# Patient Record
Sex: MALE | Race: WHITE | NOT HISPANIC OR LATINO | Employment: FULL TIME | URBAN - METROPOLITAN AREA
[De-identification: names, ages, dates, MRNs, and addresses within clinical notes are randomized per-mention and may not be internally consistent; named-entity substitution may affect disease eponyms.]

---

## 2017-10-16 ENCOUNTER — APPOINTMENT (OUTPATIENT)
Dept: SOCIAL WORK | Facility: CLINIC | Age: 52
End: 2017-10-16

## 2017-10-16 PROCEDURE — 90686 IIV4 VACC NO PRSV 0.5 ML IM: CPT | Performed by: REGISTERED NURSE

## 2018-10-03 ENCOUNTER — IMMUNIZATION (OUTPATIENT)
Dept: SOCIAL WORK | Facility: CLINIC | Age: 53
End: 2018-10-03

## 2018-10-03 DIAGNOSIS — Z23 NEED FOR VACCINATION: ICD-10-CM

## 2018-10-03 PROCEDURE — 90686 IIV4 VACC NO PRSV 0.5 ML IM: CPT | Performed by: REGISTERED NURSE

## 2019-10-22 ENCOUNTER — IMMUNIZATION (OUTPATIENT)
Dept: SOCIAL WORK | Facility: CLINIC | Age: 54
End: 2019-10-22

## 2019-10-22 DIAGNOSIS — Z23 NEED FOR VACCINATION: ICD-10-CM

## 2019-10-22 PROCEDURE — 90686 IIV4 VACC NO PRSV 0.5 ML IM: CPT | Performed by: REGISTERED NURSE

## 2019-11-04 ENCOUNTER — TELEPHONE (OUTPATIENT)
Dept: SCHEDULING | Facility: IMAGING CENTER | Age: 54
End: 2019-11-04

## 2019-11-11 ENCOUNTER — OFFICE VISIT (OUTPATIENT)
Dept: MEDICAL GROUP | Facility: MEDICAL CENTER | Age: 54
End: 2019-11-11
Payer: COMMERCIAL

## 2019-11-11 VITALS
DIASTOLIC BLOOD PRESSURE: 80 MMHG | SYSTOLIC BLOOD PRESSURE: 104 MMHG | HEIGHT: 69 IN | OXYGEN SATURATION: 97 % | HEART RATE: 72 BPM | WEIGHT: 197.53 LBS | TEMPERATURE: 98.1 F | BODY MASS INDEX: 29.26 KG/M2

## 2019-11-11 DIAGNOSIS — E78.00 PURE HYPERCHOLESTEROLEMIA: ICD-10-CM

## 2019-11-11 DIAGNOSIS — I10 ESSENTIAL HYPERTENSION: ICD-10-CM

## 2019-11-11 DIAGNOSIS — E11.9 CONTROLLED TYPE 2 DIABETES MELLITUS WITHOUT COMPLICATION, WITHOUT LONG-TERM CURRENT USE OF INSULIN (HCC): ICD-10-CM

## 2019-11-11 DIAGNOSIS — F33.0 MILD EPISODE OF RECURRENT MAJOR DEPRESSIVE DISORDER (HCC): ICD-10-CM

## 2019-11-11 PROCEDURE — 99204 OFFICE O/P NEW MOD 45 MIN: CPT | Performed by: FAMILY MEDICINE

## 2019-11-11 ASSESSMENT — ENCOUNTER SYMPTOMS
CHILLS: 0
COUGH: 0
HEADACHES: 0
DIZZINESS: 0
PALPITATIONS: 0
FEVER: 0
BLURRED VISION: 0
BRUISES/BLEEDS EASILY: 0
VOMITING: 0
DIARRHEA: 0
FALLS: 0
DOUBLE VISION: 0

## 2019-11-11 ASSESSMENT — PATIENT HEALTH QUESTIONNAIRE - PHQ9: CLINICAL INTERPRETATION OF PHQ2 SCORE: 0

## 2019-11-11 NOTE — ASSESSMENT & PLAN NOTE
For the past year has been feeling down  Lack of motivation  Very unhappy at job  , good relationship with wife.   Not finding wallace in the things he used to.   Has tried meds before with no improvement   Interested in therapy.   No SI

## 2019-11-11 NOTE — PROGRESS NOTES
Subjective:     CC: Diagnoses of Controlled type 2 diabetes mellitus without complication, without long-term current use of insulin (HCC), Essential hypertension, Pure hypercholesterolemia, and Mild episode of recurrent major depressive disorder (HCC) were pertinent to this visit.    HPI: Patient is a 54 y.o. male established patient who presents today to [facility].      Controlled type 2 diabetes mellitus without complication, without long-term current use of insulin (HCC)  Chronic problem.  Currently on metformin 500 mg daily.  Last lab done in May 2019. Needs refill on metformin. He was started on metformin in May.   Stopped drinking soda.   Trying to watch what he eats.   Has lost 10lbs since his diagnosis.   Last A1c was 6 months ago.    Mild episode of recurrent major depressive disorder (HCC)  New problem.  For the past year has been feeling down  Lack of motivation  Very unhappy at job  , good relationship with wife.   Not finding wallace in the things he used to.   Has tried meds before with no improvement, this was years ago.  He does not want to restart medication.  He is however, interested in therapy.   No SI    Pure hypercholesterolemia  Chronic problem.  The patient is currently on atorvastatin.  Denies any side effects.  Last labs done in May 2019.    Essential hypertension  Chronic problem.  Currently on losartan.  Blood pressures well controlled.  Denies any side effects from medication.  He does not need a refill today.      Past Medical History:   Diagnosis Date   • Allergy    • Hypertension        Social History     Tobacco Use   • Smoking status: Never Smoker   • Smokeless tobacco: Never Used   Substance Use Topics   • Alcohol use: Yes     Comment: less than once a week   • Drug use: Never       Current Outpatient Medications Ordered in Epic   Medication Sig Dispense Refill   • metFORMIN (GLUCOPHAGE) 500 MG Tab Take 1 Tab by mouth every day. 90 Tab 1   • cetirizine (KLS ALLER-DOMENICO) 10 MG  "Tab Take 10 mg by mouth every day.     • ATORVASTATIN CALCIUM PO Take  by mouth.     • LOSARTAN POTASSIUM PO Take  by mouth.       No current Epic-ordered facility-administered medications on file.        Allergies:  Patient has no known allergies.    Health Maintenance: Completed    Review of Systems   Constitutional: Negative for chills and fever.   HENT: Negative for ear pain and hearing loss.    Eyes: Negative for blurred vision and double vision.   Respiratory: Negative for cough.    Cardiovascular: Negative for chest pain and palpitations.   Gastrointestinal: Negative for diarrhea and vomiting.   Genitourinary: Negative for hematuria.   Musculoskeletal: Negative for falls.   Skin: Negative for rash.   Neurological: Negative for dizziness and headaches.   Endo/Heme/Allergies: Does not bruise/bleed easily.       Objective:       Exam:  /80 (BP Location: Left arm, Patient Position: Sitting, BP Cuff Size: Adult)   Pulse 72   Temp 36.7 °C (98.1 °F) (Temporal)   Ht 1.753 m (5' 9\")   Wt 89.6 kg (197 lb 8.5 oz)   SpO2 97%   BMI 29.17 kg/m²  Body mass index is 29.17 kg/m².      General: Normal appearing. No distress.  HEAD: NCAT  EYES: conjunctiva clear, lids without ptosis, pupils equal  and reactive to light  EARS: ears normal shape and contour, canals are clear bilaterally, TMs clear  MOUTH: oropharynx is without erythema, edema or exudates.   Neck: Supple without masses. Thyroid is not enlarged. Normal ROM  Pulmonary: Clear to ausculation.  Normal effort. No rales, ronchi, or wheezing.  Cardiovascular: Regular rate and rhythm, no murmur. No LE edema  Neurologic: Grossly normal, no focal deficits  Lymph: No cervical or supraclavicular lymph nodes are palpable  Skin: Warm and dry.  No obvious lesions.  Musculoskeletal: Normal gait and station.   Psych: Normal mood and affect. Alert and oriented x3. Judgment and insight is normal.      Assessment & Plan:     54 y.o. male with the following -     1. " Controlled type 2 diabetes mellitus without complication, without long-term current use of insulin (HCC)  Chronic problem, need to discuss.  Refill placed for metformin 500 mg daily.  Orders placed for following labs.  Plan to follow-up in 1 month to review labs.  - HEMOGLOBIN A1C; Future  - Lipid Profile; Future  - MICROALBUMIN CREAT RATIO URINE; Future  - Comp Metabolic Panel; Future    2. Essential hypertension  Chronic problem, new to discuss.  Well-controlled on losartan.  - Lipid Profile; Future    3. Pure hypercholesterolemia  Chronic problem, new to discuss.  Ordered the following labs.  Currently on atorvastatin.  - Lipid Profile; Future    4. Mild episode of recurrent major depressive disorder (HCC)  New problem.  The patient reports mild depression.  Denies any suicidal ideations.  He declines medication management currently.  However, he is interested in a referral to behavioral health.  - REFERRAL TO BEHAVIORAL HEALTH      Return in about 4 weeks (around 12/9/2019).    Please note that this dictation was created using voice recognition software. I have made every reasonable attempt to correct obvious errors, but I expect that there are errors of grammar and possibly content that I did not discover before finalizing the note.

## 2019-11-11 NOTE — ASSESSMENT & PLAN NOTE
Chronic problem.  Currently on losartan.  Blood pressures well controlled.  Denies any side effects from medication.  He does not need a refill today.

## 2019-11-11 NOTE — ASSESSMENT & PLAN NOTE
Last lab done in May 2019. Needs refill on metformin. He was started on metformin in May.   Stopped drinking soda.   Trying to watch he eats.   Has lost 10lbs since his diagnosis.

## 2019-11-11 NOTE — LETTER
[facility]  Love Redding M.D.  [address]  [place] [address]  Fax: [phone]   Authorization for Release/Disclosure of   Protected Health Information   Name: RUDDY MOSLEY : 1965 SSN: [id number]   Address: [facility] [place] [address] Phone:    [phone] (home)    I authorize the entity listed below to release/disclose the PHI below to:   [facility]/Love Redding M.D. and Love Redding M.D.   Provider or Entity Name:  [facility]   Address  City, [facility], [facility] Phone:  [phone]    Fax:  [phone]   Reason for request: continuity of care   Information to be released:    [ XXX ] LAST COLONOSCOPY,  including any PATH REPORT and follow-up  [  ] LAST FIT/COLOGUARD RESULT [  ] LAST DEXA  [  ] LAST MAMMOGRAM  [  ] LAST PAP  [  ] LAST LABS [  ] RETINA EXAM REPORT  [  ] IMMUNIZATION RECORDS  [  ] Release all info      [  ] Check here and initial the line next to each item to release ALL health information INCLUDING  _____ Care and treatment for drug and / or alcohol abuse  _____ HIV testing, infection status, or AIDS  _____ Genetic Testing    DATES OF SERVICE OR TIME PERIOD TO BE DISCLOSED: _____________  I understand and acknowledge that:  * This Authorization may be revoked at any time by you in writing, except if your health information has already been used or disclosed.  * Your health information that will be used or disclosed as a result of you signing this authorization could be re-disclosed by the recipient. If this occurs, your re-disclosed health information may no longer be protected by State or Federal laws.  * You may refuse to sign this Authorization. Your refusal will not affect your ability to obtain treatment.  * This Authorization becomes effective upon signing and will  on (date) __________.      If no date is indicated, this Authorization will  one (1) year from the signature date.    Name: Ruddy Mosley    Signature:   Date:          11/11/2019       PLEASE FAX REQUESTED RECORDS BACK TO: [phone]

## 2019-11-12 LAB
ALBUMIN SERPL-MCNC: 4.4 G/DL (ref 3.5–5.5)
ALBUMIN/CREAT UR: <4.5 MG/G CREAT (ref 0–30)
ALBUMIN/GLOB SERPL: 1.6 {RATIO} (ref 1.2–2.2)
ALP SERPL-CCNC: 75 IU/L (ref 39–117)
ALT SERPL-CCNC: 22 IU/L (ref 0–44)
AST SERPL-CCNC: 18 IU/L (ref 0–40)
BILIRUB SERPL-MCNC: 0.6 MG/DL (ref 0–1.2)
BUN SERPL-MCNC: 19 MG/DL (ref 6–24)
BUN/CREAT SERPL: 17 (ref 9–20)
CALCIUM SERPL-MCNC: 9.5 MG/DL (ref 8.7–10.2)
CHLORIDE SERPL-SCNC: 106 MMOL/L (ref 96–106)
CHOLEST SERPL-MCNC: 140 MG/DL (ref 100–199)
CO2 SERPL-SCNC: 18 MMOL/L (ref 20–29)
CREAT SERPL-MCNC: 1.15 MG/DL (ref 0.76–1.27)
CREAT UR-MCNC: 67 MG/DL
GLOBULIN SER CALC-MCNC: 2.7 G/DL (ref 1.5–4.5)
GLUCOSE SERPL-MCNC: 102 MG/DL (ref 65–99)
HBA1C MFR BLD: 6.2 % (ref 4.8–5.6)
HDLC SERPL-MCNC: 42 MG/DL
LABORATORY COMMENT REPORT: NORMAL
LDLC SERPL CALC-MCNC: 74 MG/DL (ref 0–99)
MICROALBUMIN UR-MCNC: <3 UG/ML
POTASSIUM SERPL-SCNC: 4.3 MMOL/L (ref 3.5–5.2)
PROT SERPL-MCNC: 7.1 G/DL (ref 6–8.5)
SODIUM SERPL-SCNC: 140 MMOL/L (ref 134–144)
TRIGL SERPL-MCNC: 119 MG/DL (ref 0–149)
VLDLC SERPL CALC-MCNC: 24 MG/DL (ref 5–40)

## 2019-12-10 ENCOUNTER — OFFICE VISIT (OUTPATIENT)
Dept: MEDICAL GROUP | Facility: MEDICAL CENTER | Age: 54
End: 2019-12-10
Payer: COMMERCIAL

## 2019-12-10 VITALS
DIASTOLIC BLOOD PRESSURE: 86 MMHG | OXYGEN SATURATION: 96 % | TEMPERATURE: 97.5 F | WEIGHT: 200.18 LBS | HEART RATE: 71 BPM | HEIGHT: 69 IN | BODY MASS INDEX: 29.65 KG/M2 | SYSTOLIC BLOOD PRESSURE: 116 MMHG

## 2019-12-10 DIAGNOSIS — E66.3 OVERWEIGHT: ICD-10-CM

## 2019-12-10 DIAGNOSIS — Z71.3 DIETARY COUNSELING: ICD-10-CM

## 2019-12-10 DIAGNOSIS — E78.00 PURE HYPERCHOLESTEROLEMIA: ICD-10-CM

## 2019-12-10 DIAGNOSIS — I10 ESSENTIAL HYPERTENSION: ICD-10-CM

## 2019-12-10 DIAGNOSIS — E11.9 CONTROLLED TYPE 2 DIABETES MELLITUS WITHOUT COMPLICATION, WITHOUT LONG-TERM CURRENT USE OF INSULIN (HCC): ICD-10-CM

## 2019-12-10 PROCEDURE — 99214 OFFICE O/P EST MOD 30 MIN: CPT | Mod: 25 | Performed by: FAMILY MEDICINE

## 2019-12-10 PROCEDURE — 99401 PREV MED CNSL INDIV APPRX 15: CPT | Performed by: FAMILY MEDICINE

## 2019-12-10 RX ORDER — ATORVASTATIN CALCIUM 40 MG/1
40 TABLET, FILM COATED ORAL
Refills: 3 | COMMUNITY
Start: 2019-11-04 | End: 2020-04-27

## 2019-12-10 RX ORDER — LOSARTAN POTASSIUM 50 MG/1
50 TABLET ORAL
Refills: 3 | COMMUNITY
Start: 2019-11-04 | End: 2020-04-27

## 2019-12-10 ASSESSMENT — PATIENT HEALTH QUESTIONNAIRE - PHQ9
SUM OF ALL RESPONSES TO PHQ QUESTIONS 1-9: 3
7. TROUBLE CONCENTRATING ON THINGS, SUCH AS READING THE NEWSPAPER OR WATCHING TELEVISION: NOT AT ALL
8. MOVING OR SPEAKING SO SLOWLY THAT OTHER PEOPLE COULD HAVE NOTICED. OR THE OPPOSITE, BEING SO FIGETY OR RESTLESS THAT YOU HAVE BEEN MOVING AROUND A LOT MORE THAN USUAL: NOT AT ALL
3. TROUBLE FALLING OR STAYING ASLEEP OR SLEEPING TOO MUCH: NOT AT ALL
1. LITTLE INTEREST OR PLEASURE IN DOING THINGS: SEVERAL DAYS
2. FEELING DOWN, DEPRESSED, IRRITABLE, OR HOPELESS: SEVERAL DAYS
5. POOR APPETITE OR OVEREATING: NOT AT ALL
9. THOUGHTS THAT YOU WOULD BE BETTER OFF DEAD, OR OF HURTING YOURSELF: NOT AT ALL
6. FEELING BAD ABOUT YOURSELF - OR THAT YOU ARE A FAILURE OR HAVE LET YOURSELF OR YOUR FAMILY DOWN: NOT AL ALL
4. FEELING TIRED OR HAVING LITTLE ENERGY: SEVERAL DAYS
SUM OF ALL RESPONSES TO PHQ9 QUESTIONS 1 AND 2: 2

## 2019-12-10 NOTE — PROGRESS NOTES
Subjective:     CC: Diagnoses of Pure hypercholesterolemia, Overweight, Essential hypertension, and Controlled type 2 diabetes mellitus without complication, without long-term current use of insulin (HCC) were pertinent to this visit.    HPI: Patient is a 54 y.o. male established patient who presents today to follow up on labs.       Pure hypercholesterolemia  Chronic problem. Well controlled. Currently on atorvastatin 40mg. LDL was 74. Patient is wondering if he could lower his doasage again.     Overweight  Chronic problem.  Patient reports a somewhat healthy diet regular exercise.  He is interested in coming off of his medications via weight loss.    Essential hypertension  Chronic problem, well-controlled.    Controlled type 2 diabetes mellitus without complication, without long-term current use of insulin (HCC)  Chronic problem.  A1c was 6.2%.  The patient is currently on metformin 500 mg daily.  He reports a somewhat healthy diet.      Past Medical History:   Diagnosis Date   • Allergy    • Hypertension        Social History     Tobacco Use   • Smoking status: Never Smoker   • Smokeless tobacco: Never Used   Substance Use Topics   • Alcohol use: Yes     Comment: less than once a week   • Drug use: Never       Current Outpatient Medications Ordered in Epic   Medication Sig Dispense Refill   • metFORMIN (GLUCOPHAGE) 500 MG Tab Take 1 Tab by mouth every day. 90 Tab 1   • cetirizine (KLS ALLER-DOMENICO) 10 MG Tab Take 10 mg by mouth every day.     • atorvastatin (LIPITOR) 40 MG Tab Take 40 mg by mouth.  3   • losartan (COZAAR) 50 MG Tab Take 50 mg by mouth.  3     No current [facility]-ordered facility-administered medications on file.        Allergies:  Patient has no known allergies.    Health Maintenance: Completed    ROS:  Pulm: no sob, no cough  CV: no chest pain, no palpitations      Objective:       Exam:  /86 (BP Location: Right arm, Patient Position: Sitting, BP Cuff Size: Adult)   Pulse 71   Temp 36.4 °C  "(97.5 °F) (Temporal)   Ht 1.753 m (5' 9\")   Wt 90.8 kg (200 lb 2.8 oz)   SpO2 96%   BMI 29.56 kg/m²  Body mass index is 29.56 kg/m².    General: Normal appearing. No distress.  HEAD: NCAT  EYES: conjunctiva clear, lids without ptosis, pupils equal and reactive to light  EARS: ears normal shape and contour.  MOUTH: normal dentition   Neck:  Normal ROM  Pulmonary: Normal effort. Normal respiratory rate.  Cardiovascular: Well perfused. No LE edema  Neurologic: Grossly normal, no focal deficits  Skin: Warm and dry.  No obvious lesions.  Musculoskeletal: Normal gait and station.   Psych: Normal mood and affect. Alert and oriented x3. Judgment and insight is normal.    Labs: 11/11/2019 results reviewed and discussed with the patient, questions answered.    Assessment & Plan:     54 y.o. male with the following -     1. Pure hypercholesterolemia  Chronic problem, well-controlled.  The patient was hoping that he could decrease his statin, however, advised that his LDL goal should be 70 given his type 2 diabetes and risk for heart disease.  He voices understanding, we will continue on atorvastatin 40 mg.    2. Overweight  Chronic problem.  15 minutes spent face-to-face on preventive health counseling.  Discussed diet and exercise at length today.  Reviewed and intermittent fasting diet as well as low-carb fat.    3. Essential hypertension  Chronic problem, well-controlled.  As above, discussed diet and exercise at length.    4. Controlled type 2 diabetes mellitus without complication, without long-term current use of insulin (HCC)  Chronic problem, well-controlled.  A1c is 6.2%.  Currently on metformin 500 mg.  Discussed diet and exercise.  We also discussed goal weight of 180, and reaching this may help him stop the metformin.      Return in about 6 months (around 6/10/2020).    Please note that this dictation was created using voice recognition software. I have made every reasonable attempt to correct obvious errors, " but I expect that there are errors of grammar and possibly content that I did not discover before finalizing the note.

## 2019-12-10 NOTE — ASSESSMENT & PLAN NOTE
Chronic problem. Well controlled. Currently on atorvastatin 40mg. LDL was 74. Patient is wondering if he could lower his doasage again.

## 2019-12-10 NOTE — ASSESSMENT & PLAN NOTE
Chronic problem.  Patient reports a somewhat healthy diet regular exercise.  He is interested in coming off of his medications via weight loss.

## 2019-12-11 NOTE — ASSESSMENT & PLAN NOTE
Chronic problem.  A1c was 6.2%.  The patient is currently on metformin 500 mg daily.  He reports a somewhat healthy diet.

## 2020-01-20 ENCOUNTER — HOSPITAL ENCOUNTER (OUTPATIENT)
Dept: RADIOLOGY | Facility: MEDICAL CENTER | Age: 55
End: 2020-01-20
Attending: SPECIALIST
Payer: COMMERCIAL

## 2020-01-20 DIAGNOSIS — J34.2 DEVIATED NASAL SEPTUM: ICD-10-CM

## 2020-01-20 DIAGNOSIS — J31.0 CHRONIC RHINITIS: ICD-10-CM

## 2020-01-20 DIAGNOSIS — J35.1 HYPERTROPHY OF TONSILS ALONE: ICD-10-CM

## 2020-01-20 DIAGNOSIS — R06.5: ICD-10-CM

## 2020-01-20 DIAGNOSIS — R06.83 SNORING: ICD-10-CM

## 2020-01-20 PROCEDURE — 700117 HCHG RX CONTRAST REV CODE 255: Performed by: SPECIALIST

## 2020-01-20 PROCEDURE — 70543 MRI ORBT/FAC/NCK W/O &W/DYE: CPT

## 2020-01-20 PROCEDURE — A9576 INJ PROHANCE MULTIPACK: HCPCS | Performed by: SPECIALIST

## 2020-01-20 RX ADMIN — GADOTERIDOL 18 ML: 279.3 INJECTION, SOLUTION INTRAVENOUS at 16:45

## 2020-04-10 ENCOUNTER — HOSPITAL ENCOUNTER (OUTPATIENT)
Dept: RADIOLOGY | Facility: MEDICAL CENTER | Age: 55
End: 2020-04-10
Attending: NURSE PRACTITIONER
Payer: COMMERCIAL

## 2020-04-14 DIAGNOSIS — I10 ESSENTIAL HYPERTENSION: ICD-10-CM

## 2020-04-24 ENCOUNTER — HOSPITAL ENCOUNTER (OUTPATIENT)
Dept: RADIOLOGY | Facility: MEDICAL CENTER | Age: 55
End: 2020-04-24
Attending: NURSE PRACTITIONER
Payer: COMMERCIAL

## 2020-04-24 DIAGNOSIS — D35.2 BENIGN NEOPLASM OF PITUITARY GLAND (HCC): ICD-10-CM

## 2020-04-24 PROCEDURE — A9576 INJ PROHANCE MULTIPACK: HCPCS | Performed by: NURSE PRACTITIONER

## 2020-04-24 PROCEDURE — 700117 HCHG RX CONTRAST REV CODE 255: Performed by: NURSE PRACTITIONER

## 2020-04-24 PROCEDURE — 70553 MRI BRAIN STEM W/O & W/DYE: CPT

## 2020-04-24 RX ADMIN — GADOTERIDOL 20 ML: 279.3 INJECTION, SOLUTION INTRAVENOUS at 15:20

## 2020-04-27 RX ORDER — LOSARTAN POTASSIUM 50 MG/1
TABLET ORAL
Qty: 90 TAB | Refills: 3 | Status: SHIPPED | OUTPATIENT
Start: 2020-04-27 | End: 2021-04-30

## 2020-04-27 RX ORDER — ATORVASTATIN CALCIUM 40 MG/1
TABLET, FILM COATED ORAL
Qty: 90 TAB | Refills: 3 | Status: SHIPPED | OUTPATIENT
Start: 2020-04-27 | End: 2021-06-11

## 2020-05-09 DIAGNOSIS — E11.9 CONTROLLED TYPE 2 DIABETES MELLITUS WITHOUT COMPLICATION, WITHOUT LONG-TERM CURRENT USE OF INSULIN (HCC): ICD-10-CM

## 2020-06-06 LAB
ALBUMIN SERPL-MCNC: 4.5 G/DL (ref 3.8–4.9)
ALBUMIN/CREAT UR: <4 MG/G CREAT (ref 0–29)
ALBUMIN/GLOB SERPL: 2.1 {RATIO} (ref 1.2–2.2)
ALP SERPL-CCNC: 75 IU/L (ref 39–117)
ALT SERPL-CCNC: 23 IU/L (ref 0–44)
AST SERPL-CCNC: 17 IU/L (ref 0–40)
BILIRUB SERPL-MCNC: 0.7 MG/DL (ref 0–1.2)
BUN SERPL-MCNC: 15 MG/DL (ref 6–24)
BUN/CREAT SERPL: 18 (ref 9–20)
CALCIUM SERPL-MCNC: 9.5 MG/DL (ref 8.7–10.2)
CHLORIDE SERPL-SCNC: 105 MMOL/L (ref 96–106)
CHOLEST SERPL-MCNC: 159 MG/DL (ref 100–199)
CO2 SERPL-SCNC: 20 MMOL/L (ref 20–29)
CREAT SERPL-MCNC: 0.83 MG/DL (ref 0.76–1.27)
CREAT UR-MCNC: 66.9 MG/DL
GLOBULIN SER CALC-MCNC: 2.1 G/DL (ref 1.5–4.5)
GLUCOSE SERPL-MCNC: 91 MG/DL (ref 65–99)
HBA1C MFR BLD: 6.2 % (ref 4.8–5.6)
HDLC SERPL-MCNC: 44 MG/DL
LABORATORY COMMENT REPORT: NORMAL
LDLC SERPL CALC-MCNC: 87 MG/DL (ref 0–99)
MICROALBUMIN UR-MCNC: <3 UG/ML
POTASSIUM SERPL-SCNC: 4.1 MMOL/L (ref 3.5–5.2)
PROT SERPL-MCNC: 6.6 G/DL (ref 6–8.5)
SODIUM SERPL-SCNC: 140 MMOL/L (ref 134–144)
TRIGL SERPL-MCNC: 142 MG/DL (ref 0–149)
VLDLC SERPL CALC-MCNC: 28 MG/DL (ref 5–40)

## 2020-06-11 ENCOUNTER — OFFICE VISIT (OUTPATIENT)
Dept: MEDICAL GROUP | Facility: MEDICAL CENTER | Age: 55
End: 2020-06-11
Payer: COMMERCIAL

## 2020-06-11 VITALS
SYSTOLIC BLOOD PRESSURE: 120 MMHG | OXYGEN SATURATION: 98 % | DIASTOLIC BLOOD PRESSURE: 84 MMHG | HEIGHT: 69 IN | HEART RATE: 87 BPM | WEIGHT: 199.52 LBS | BODY MASS INDEX: 29.55 KG/M2 | TEMPERATURE: 97.6 F

## 2020-06-11 DIAGNOSIS — E11.9 CONTROLLED TYPE 2 DIABETES MELLITUS WITHOUT COMPLICATION, WITHOUT LONG-TERM CURRENT USE OF INSULIN (HCC): ICD-10-CM

## 2020-06-11 DIAGNOSIS — E78.00 PURE HYPERCHOLESTEROLEMIA: ICD-10-CM

## 2020-06-11 DIAGNOSIS — I10 ESSENTIAL HYPERTENSION: ICD-10-CM

## 2020-06-11 DIAGNOSIS — D35.2 PITUITARY ADENOMA (HCC): ICD-10-CM

## 2020-06-11 PROCEDURE — 99214 OFFICE O/P EST MOD 30 MIN: CPT | Performed by: FAMILY MEDICINE

## 2020-06-11 NOTE — ASSESSMENT & PLAN NOTE
Recently diagnosed incidentally.  Prolactin level is slightly elevated.   Denies any headache or change in vision  They plan to move forward with surgery with Dr. Reyez.

## 2020-06-11 NOTE — PROGRESS NOTES
Subjective:     CC: Diagnoses of Pituitary adenoma (HCC), Essential hypertension, Controlled type 2 diabetes mellitus without complication, without long-term current use of insulin (HCC), and Pure hypercholesterolemia were pertinent to this visit.    HPI: Patient is a 54 y.o. male established patient who presents today for general follow up.     Pituitary adenoma (HCC)  Recently diagnosed incidentally.  Prolactin level is slightly elevated.   Denies any headache or change in vision  They plan to move forward with surgery with Dr. Reyez.   Followed by ENT.    Essential hypertension  Chronic problem. Well controlled on losartan 50mg.     Controlled type 2 diabetes mellitus without complication, without long-term current use of insulin (HCC)  Chronic problem. Well controlled on metformin 500mg. A1C 6.2% 6/5/20.    Pure hypercholesterolemia  Chronic problem. Currently on Atorvastatin 40mg. LDL 87 on recent lab work 6/5/20.    Past Medical History:   Diagnosis Date   • Allergy    • Hypertension        Social History     Tobacco Use   • Smoking status: Never Smoker   • Smokeless tobacco: Never Used   Substance Use Topics   • Alcohol use: Yes     Comment: less than once a week   • Drug use: Never       Current Outpatient Medications Ordered in Epic   Medication Sig Dispense Refill   • metFORMIN (GLUCOPHAGE) 500 MG Tab Take 1 Tab by mouth every day. 90 Tab 1   • atorvastatin (LIPITOR) 40 MG Tab TAKE 1 TABLET BY MOUTH ONCE DAILY AT BEDTIME 90 Tab 3   • losartan (COZAAR) 50 MG Tab TAKE 1 TABLET BY MOUTH ONCE DAILY 90 Tab 3   • cetirizine (KLS ALLER-DOMENICO) 10 MG Tab Take 10 mg by mouth every day.       No current [facility]-ordered facility-administered medications on file.        Allergies:  Patient has no known allergies.    Health Maintenance: Completed    ROS:  Pulm: no sob, no cough  CV: no chest pain, no palpitations      Objective:       Exam:  /84 (BP Location: Left arm, Patient Position: Sitting, BP Cuff Size: Adult  "long)   Pulse 87   Temp 36.4 °C (97.6 °F) (Temporal)   Ht 1.753 m (5' 9\")   Wt 90.5 kg (199 lb 8.3 oz)   SpO2 98%   BMI 29.46 kg/m²  Body mass index is 29.46 kg/m².    General: Normal appearing. No distress.  HEAD: NCAT  EYES: conjunctiva clear, lids without ptosis, pupils equal and reactive to light  EARS: ears normal shape and contour.  MOUTH: normal dentition   Neck:  Normal ROM  Pulmonary: CTAB, no W/R/R. Normal effort. Normal respiratory rate.  Cardiovascular: RRR, no M/R/R. Well perfused. No LE edema  Neurologic: Grossly normal, no focal deficits  Skin: Warm and dry.  No obvious lesions.  Musculoskeletal: Normal gait and station.   Psych: Normal mood and affect. Alert and oriented x3. Judgment and insight is normal.      Labs: 6/5/20 Results reviewed and discussed with the patient, questions answered.    Assessment & Plan:     54 y.o. male with the following -     1. Pituitary adenoma (HCC)  New problem.  Recently incidentally diagnosed via CT scan.  Currently asymptomatic.  However, adenoma is rather large measuring 2.6 x 1.9 x 1.8 cm.  There plan to move forward with surgical removal with Dr. Reyez.    2. Essential hypertension  Chronic problem, well-controlled on losartan.    3. Controlled type 2 diabetes mellitus without complication, without long-term current use of insulin (HCC)  Chronic problem, well-controlled on metformin 500 mg.  A1c 6.2%.    4. Pure hypercholesterolemia  Chronic problem, well-controlled on atorvastatin 40 mg.  LDL 87.      Return in about 6 months (around 12/11/2020).    Please note that this dictation was created using voice recognition software. I have made every reasonable attempt to correct obvious errors, but I expect that there are errors of grammar and possibly content that I did not discover before finalizing the note.      "

## 2020-07-31 ENCOUNTER — OFFICE VISIT (OUTPATIENT)
Dept: ADMISSIONS | Facility: MEDICAL CENTER | Age: 55
DRG: 615 | End: 2020-07-31
Attending: NEUROLOGICAL SURGERY
Payer: COMMERCIAL

## 2020-07-31 DIAGNOSIS — Z01.812 PRE-OPERATIVE LABORATORY EXAMINATION: ICD-10-CM

## 2020-07-31 DIAGNOSIS — Z01.810 PRE-OPERATIVE CARDIOVASCULAR EXAMINATION: ICD-10-CM

## 2020-07-31 LAB
ABO GROUP BLD: NORMAL
ANION GAP SERPL CALC-SCNC: 11 MMOL/L (ref 7–16)
APTT PPP: 29.8 SEC (ref 24.7–36)
BASOPHILS # BLD AUTO: 0.6 % (ref 0–1.8)
BASOPHILS # BLD: 0.06 K/UL (ref 0–0.12)
BLD GP AB SCN SERPL QL: NORMAL
BUN SERPL-MCNC: 17 MG/DL (ref 8–22)
CALCIUM SERPL-MCNC: 9.3 MG/DL (ref 8.5–10.5)
CHLORIDE SERPL-SCNC: 104 MMOL/L (ref 96–112)
CO2 SERPL-SCNC: 24 MMOL/L (ref 20–33)
COVID ORDER STATUS COVID19: NORMAL
CREAT SERPL-MCNC: 1.03 MG/DL (ref 0.5–1.4)
EKG IMPRESSION: NORMAL
EOSINOPHIL # BLD AUTO: 0.09 K/UL (ref 0–0.51)
EOSINOPHIL NFR BLD: 0.9 % (ref 0–6.9)
ERYTHROCYTE [DISTWIDTH] IN BLOOD BY AUTOMATED COUNT: 39.9 FL (ref 35.9–50)
GLUCOSE SERPL-MCNC: 85 MG/DL (ref 65–99)
HCT VFR BLD AUTO: 45.1 % (ref 42–52)
HGB BLD-MCNC: 14.8 G/DL (ref 14–18)
IMM GRANULOCYTES # BLD AUTO: 0.03 K/UL (ref 0–0.11)
IMM GRANULOCYTES NFR BLD AUTO: 0.3 % (ref 0–0.9)
INR PPP: 0.96 (ref 0.87–1.13)
LYMPHOCYTES # BLD AUTO: 4.49 K/UL (ref 1–4.8)
LYMPHOCYTES NFR BLD: 42.8 % (ref 22–41)
MCH RBC QN AUTO: 29.8 PG (ref 27–33)
MCHC RBC AUTO-ENTMCNC: 32.8 G/DL (ref 33.7–35.3)
MCV RBC AUTO: 90.9 FL (ref 81.4–97.8)
MONOCYTES # BLD AUTO: 0.8 K/UL (ref 0–0.85)
MONOCYTES NFR BLD AUTO: 7.6 % (ref 0–13.4)
NEUTROPHILS # BLD AUTO: 5.01 K/UL (ref 1.82–7.42)
NEUTROPHILS NFR BLD: 47.8 % (ref 44–72)
NRBC # BLD AUTO: 0 K/UL
NRBC BLD-RTO: 0 /100 WBC
PLATELET # BLD AUTO: 299 K/UL (ref 164–446)
PMV BLD AUTO: 10.1 FL (ref 9–12.9)
POTASSIUM SERPL-SCNC: 4.1 MMOL/L (ref 3.6–5.5)
PROTHROMBIN TIME: 13.1 SEC (ref 12–14.6)
RBC # BLD AUTO: 4.96 M/UL (ref 4.7–6.1)
RH BLD: NORMAL
SODIUM SERPL-SCNC: 139 MMOL/L (ref 135–145)
WBC # BLD AUTO: 10.5 K/UL (ref 4.8–10.8)

## 2020-07-31 PROCEDURE — 86901 BLOOD TYPING SEROLOGIC RH(D): CPT

## 2020-07-31 PROCEDURE — 93005 ELECTROCARDIOGRAM TRACING: CPT

## 2020-07-31 PROCEDURE — 93010 ELECTROCARDIOGRAM REPORT: CPT | Performed by: INTERNAL MEDICINE

## 2020-07-31 PROCEDURE — U0003 INFECTIOUS AGENT DETECTION BY NUCLEIC ACID (DNA OR RNA); SEVERE ACUTE RESPIRATORY SYNDROME CORONAVIRUS 2 (SARS-COV-2) (CORONAVIRUS DISEASE [COVID-19]), AMPLIFIED PROBE TECHNIQUE, MAKING USE OF HIGH THROUGHPUT TECHNOLOGIES AS DESCRIBED BY CMS-2020-01-R: HCPCS

## 2020-07-31 PROCEDURE — 36415 COLL VENOUS BLD VENIPUNCTURE: CPT

## 2020-07-31 PROCEDURE — 85610 PROTHROMBIN TIME: CPT

## 2020-07-31 PROCEDURE — 86850 RBC ANTIBODY SCREEN: CPT

## 2020-07-31 PROCEDURE — 80048 BASIC METABOLIC PNL TOTAL CA: CPT

## 2020-07-31 PROCEDURE — 85025 COMPLETE CBC W/AUTO DIFF WBC: CPT

## 2020-07-31 PROCEDURE — 86900 BLOOD TYPING SEROLOGIC ABO: CPT

## 2020-07-31 PROCEDURE — 85730 THROMBOPLASTIN TIME PARTIAL: CPT

## 2020-08-03 LAB
SARS-COV-2 RNA RESP QL NAA+PROBE: NOTDETECTED
SPECIMEN SOURCE: NORMAL

## 2020-08-07 ENCOUNTER — OFFICE VISIT (OUTPATIENT)
Dept: ADMISSIONS | Facility: MEDICAL CENTER | Age: 55
DRG: 615 | End: 2020-08-07
Attending: NEUROLOGICAL SURGERY
Payer: COMMERCIAL

## 2020-08-07 DIAGNOSIS — Z01.812 PRE-OPERATIVE LABORATORY EXAMINATION: ICD-10-CM

## 2020-08-07 LAB — COVID ORDER STATUS COVID19: NORMAL

## 2020-08-07 PROCEDURE — U0003 INFECTIOUS AGENT DETECTION BY NUCLEIC ACID (DNA OR RNA); SEVERE ACUTE RESPIRATORY SYNDROME CORONAVIRUS 2 (SARS-COV-2) (CORONAVIRUS DISEASE [COVID-19]), AMPLIFIED PROBE TECHNIQUE, MAKING USE OF HIGH THROUGHPUT TECHNOLOGIES AS DESCRIBED BY CMS-2020-01-R: HCPCS

## 2020-08-08 LAB
SARS-COV-2 RNA RESP QL NAA+PROBE: NOTDETECTED
SPECIMEN SOURCE: NORMAL

## 2020-08-11 ENCOUNTER — ANESTHESIA EVENT (OUTPATIENT)
Dept: SURGERY | Facility: MEDICAL CENTER | Age: 55
DRG: 615 | End: 2020-08-11
Payer: COMMERCIAL

## 2020-08-11 NOTE — OR NURSING
COVID-19 Pre-surgery screenin. Do you have an undiagnosed respiratory illness or symptoms such as coughing or sneezing?No   a. Onset of Sx No  b. Acute vs. chronic respiratory illness No    2. Do you have an unexplained fever greater than 100.4 degrees Fahrenheit or 38 degrees Celsius?     No     3. Have you had direct exposure to a patient who tested positive for Covid-19?    No     4. Have you had any loss of your sense of taste or smell? Have you had N/V or sore throat? No    Patient has been informed of visitor policy and asked to wear a mask upon entering the hospital   Yes

## 2020-08-12 ENCOUNTER — APPOINTMENT (OUTPATIENT)
Dept: RADIOLOGY | Facility: MEDICAL CENTER | Age: 55
DRG: 615 | End: 2020-08-12
Attending: NEUROLOGICAL SURGERY
Payer: COMMERCIAL

## 2020-08-12 ENCOUNTER — ANESTHESIA (OUTPATIENT)
Dept: SURGERY | Facility: MEDICAL CENTER | Age: 55
DRG: 615 | End: 2020-08-12
Payer: COMMERCIAL

## 2020-08-12 ENCOUNTER — HOSPITAL ENCOUNTER (INPATIENT)
Facility: MEDICAL CENTER | Age: 55
LOS: 2 days | DRG: 615 | End: 2020-08-14
Attending: NEUROLOGICAL SURGERY | Admitting: NEUROLOGICAL SURGERY
Payer: COMMERCIAL

## 2020-08-12 LAB
ABO + RH BLD: NORMAL
GLUCOSE BLD-MCNC: 108 MG/DL (ref 65–99)
GLUCOSE BLD-MCNC: 119 MG/DL (ref 65–99)
GLUCOSE BLD-MCNC: 139 MG/DL (ref 65–99)
PATHOLOGY CONSULT NOTE: NORMAL

## 2020-08-12 PROCEDURE — A9270 NON-COVERED ITEM OR SERVICE: HCPCS | Performed by: NEUROLOGICAL SURGERY

## 2020-08-12 PROCEDURE — 70450 CT HEAD/BRAIN W/O DYE: CPT | Mod: MG

## 2020-08-12 PROCEDURE — 88331 PATH CONSLTJ SURG 1 BLK 1SPC: CPT

## 2020-08-12 PROCEDURE — 700112 HCHG RX REV CODE 229: Performed by: NURSE PRACTITIONER

## 2020-08-12 PROCEDURE — 502573 HCHG PACK, ENT: Performed by: NEUROLOGICAL SURGERY

## 2020-08-12 PROCEDURE — 700101 HCHG RX REV CODE 250: Performed by: ANESTHESIOLOGY

## 2020-08-12 PROCEDURE — 160042 HCHG SURGERY MINUTES - EA ADDL 1 MIN LEVEL 5: Performed by: NEUROLOGICAL SURGERY

## 2020-08-12 PROCEDURE — 700102 HCHG RX REV CODE 250 W/ 637 OVERRIDE(OP): Performed by: NURSE PRACTITIONER

## 2020-08-12 PROCEDURE — 160048 HCHG OR STATISTICAL LEVEL 1-5: Performed by: NEUROLOGICAL SURGERY

## 2020-08-12 PROCEDURE — 160002 HCHG RECOVERY MINUTES (STAT): Performed by: NEUROLOGICAL SURGERY

## 2020-08-12 PROCEDURE — A9576 INJ PROHANCE MULTIPACK: HCPCS | Performed by: NEUROLOGICAL SURGERY

## 2020-08-12 PROCEDURE — A9270 NON-COVERED ITEM OR SERVICE: HCPCS | Performed by: ANESTHESIOLOGY

## 2020-08-12 PROCEDURE — 700111 HCHG RX REV CODE 636 W/ 250 OVERRIDE (IP): Performed by: NURSE PRACTITIONER

## 2020-08-12 PROCEDURE — 88311 DECALCIFY TISSUE: CPT

## 2020-08-12 PROCEDURE — 09BU8ZZ EXCISION OF RIGHT ETHMOID SINUS, VIA NATURAL OR ARTIFICIAL OPENING ENDOSCOPIC: ICD-10-PCS | Performed by: OTOLARYNGOLOGY

## 2020-08-12 PROCEDURE — 88305 TISSUE EXAM BY PATHOLOGIST: CPT

## 2020-08-12 PROCEDURE — A9270 NON-COVERED ITEM OR SERVICE: HCPCS | Performed by: NURSE PRACTITIONER

## 2020-08-12 PROCEDURE — 700111 HCHG RX REV CODE 636 W/ 250 OVERRIDE (IP): Performed by: ANESTHESIOLOGY

## 2020-08-12 PROCEDURE — 500331 HCHG COTTONOID, SURG PATTIE: Performed by: NEUROLOGICAL SURGERY

## 2020-08-12 PROCEDURE — 700102 HCHG RX REV CODE 250 W/ 637 OVERRIDE(OP): Performed by: ANESTHESIOLOGY

## 2020-08-12 PROCEDURE — 160031 HCHG SURGERY MINUTES - 1ST 30 MINS LEVEL 5: Performed by: NEUROLOGICAL SURGERY

## 2020-08-12 PROCEDURE — 160036 HCHG PACU - EA ADDL 30 MINS PHASE I: Performed by: NEUROLOGICAL SURGERY

## 2020-08-12 PROCEDURE — 700101 HCHG RX REV CODE 250: Performed by: NEUROLOGICAL SURGERY

## 2020-08-12 PROCEDURE — 09TV8ZZ RESECTION OF LEFT ETHMOID SINUS, VIA NATURAL OR ARTIFICIAL OPENING ENDOSCOPIC: ICD-10-PCS | Performed by: OTOLARYNGOLOGY

## 2020-08-12 PROCEDURE — C1725 CATH, TRANSLUMIN NON-LASER: HCPCS | Performed by: NEUROLOGICAL SURGERY

## 2020-08-12 PROCEDURE — 700101 HCHG RX REV CODE 250: Performed by: NURSE PRACTITIONER

## 2020-08-12 PROCEDURE — 160035 HCHG PACU - 1ST 60 MINS PHASE I: Performed by: NEUROLOGICAL SURGERY

## 2020-08-12 PROCEDURE — 99223 1ST HOSP IP/OBS HIGH 75: CPT | Performed by: INTERNAL MEDICINE

## 2020-08-12 PROCEDURE — 500075 HCHG BLADE, CLIPPER NEURO: Performed by: NEUROLOGICAL SURGERY

## 2020-08-12 PROCEDURE — 0GB04ZZ EXCISION OF PITUITARY GLAND, PERCUTANEOUS ENDOSCOPIC APPROACH: ICD-10-PCS | Performed by: NEUROLOGICAL SURGERY

## 2020-08-12 PROCEDURE — 501838 HCHG SUTURE GENERAL: Performed by: NEUROLOGICAL SURGERY

## 2020-08-12 PROCEDURE — 700102 HCHG RX REV CODE 250 W/ 637 OVERRIDE(OP): Performed by: NEUROLOGICAL SURGERY

## 2020-08-12 PROCEDURE — 160009 HCHG ANES TIME/MIN: Performed by: NEUROLOGICAL SURGERY

## 2020-08-12 PROCEDURE — 500444 HCHG HEMOSTAT, SURGICEL 2X3: Performed by: NEUROLOGICAL SURGERY

## 2020-08-12 PROCEDURE — 110454 HCHG SHELL REV 250: Performed by: NEUROLOGICAL SURGERY

## 2020-08-12 PROCEDURE — 700117 HCHG RX CONTRAST REV CODE 255: Performed by: NEUROLOGICAL SURGERY

## 2020-08-12 PROCEDURE — 70553 MRI BRAIN STEM W/O & W/DYE: CPT | Mod: MG

## 2020-08-12 PROCEDURE — 82962 GLUCOSE BLOOD TEST: CPT | Mod: 91

## 2020-08-12 PROCEDURE — 770022 HCHG ROOM/CARE - ICU (200)

## 2020-08-12 PROCEDURE — 700105 HCHG RX REV CODE 258: Performed by: NEUROLOGICAL SURGERY

## 2020-08-12 PROCEDURE — 110371 HCHG SHELL REV 272: Performed by: NEUROLOGICAL SURGERY

## 2020-08-12 PROCEDURE — C1894 INTRO/SHEATH, NON-LASER: HCPCS | Performed by: NEUROLOGICAL SURGERY

## 2020-08-12 PROCEDURE — 700101 HCHG RX REV CODE 250: Performed by: OTOLARYNGOLOGY

## 2020-08-12 PROCEDURE — 700102 HCHG RX REV CODE 250 W/ 637 OVERRIDE(OP): Performed by: OTOLARYNGOLOGY

## 2020-08-12 PROCEDURE — 700105 HCHG RX REV CODE 258: Performed by: ANESTHESIOLOGY

## 2020-08-12 PROCEDURE — 700111 HCHG RX REV CODE 636 W/ 250 OVERRIDE (IP): Performed by: OTOLARYNGOLOGY

## 2020-08-12 PROCEDURE — A9270 NON-COVERED ITEM OR SERVICE: HCPCS | Performed by: OTOLARYNGOLOGY

## 2020-08-12 DEVICE — SEALANT DURAL AUTOSPRAY ADHERUS (5EA/PK): Type: IMPLANTABLE DEVICE | Status: FUNCTIONAL

## 2020-08-12 RX ORDER — GABAPENTIN 300 MG/1
300 CAPSULE ORAL ONCE
Status: COMPLETED | OUTPATIENT
Start: 2020-08-12 | End: 2020-08-12

## 2020-08-12 RX ORDER — AMOXICILLIN 250 MG
1 CAPSULE ORAL NIGHTLY
Status: DISCONTINUED | OUTPATIENT
Start: 2020-08-12 | End: 2020-08-14 | Stop reason: HOSPADM

## 2020-08-12 RX ORDER — CEFTRIAXONE 1 G/1
INJECTION, POWDER, FOR SOLUTION INTRAMUSCULAR; INTRAVENOUS PRN
Status: DISCONTINUED | OUTPATIENT
Start: 2020-08-12 | End: 2020-08-12 | Stop reason: SURG

## 2020-08-12 RX ORDER — EPINEPHRINE 1 MG/ML
INJECTION INTRAMUSCULAR; INTRAVENOUS; SUBCUTANEOUS
Status: DISCONTINUED | OUTPATIENT
Start: 2020-08-12 | End: 2020-08-12 | Stop reason: HOSPADM

## 2020-08-12 RX ORDER — BISACODYL 10 MG
10 SUPPOSITORY, RECTAL RECTAL
Status: DISCONTINUED | OUTPATIENT
Start: 2020-08-12 | End: 2020-08-14 | Stop reason: HOSPADM

## 2020-08-12 RX ORDER — POLYETHYLENE GLYCOL 3350 17 G/17G
1 POWDER, FOR SOLUTION ORAL 2 TIMES DAILY PRN
Status: DISCONTINUED | OUTPATIENT
Start: 2020-08-12 | End: 2020-08-14 | Stop reason: HOSPADM

## 2020-08-12 RX ORDER — CEFAZOLIN SODIUM 1 G/3ML
INJECTION, POWDER, FOR SOLUTION INTRAMUSCULAR; INTRAVENOUS
Status: DISCONTINUED | OUTPATIENT
Start: 2020-08-12 | End: 2020-08-12 | Stop reason: HOSPADM

## 2020-08-12 RX ORDER — DOCUSATE SODIUM 100 MG/1
100 CAPSULE, LIQUID FILLED ORAL 2 TIMES DAILY
Status: DISCONTINUED | OUTPATIENT
Start: 2020-08-12 | End: 2020-08-14 | Stop reason: HOSPADM

## 2020-08-12 RX ORDER — SODIUM CHLORIDE, SODIUM LACTATE, POTASSIUM CHLORIDE, CALCIUM CHLORIDE 600; 310; 30; 20 MG/100ML; MG/100ML; MG/100ML; MG/100ML
INJECTION, SOLUTION INTRAVENOUS CONTINUOUS
Status: ACTIVE | OUTPATIENT
Start: 2020-08-12 | End: 2020-08-12

## 2020-08-12 RX ORDER — LABETALOL HYDROCHLORIDE 5 MG/ML
5 INJECTION, SOLUTION INTRAVENOUS
Status: DISCONTINUED | OUTPATIENT
Start: 2020-08-12 | End: 2020-08-12 | Stop reason: HOSPADM

## 2020-08-12 RX ORDER — MEPERIDINE HYDROCHLORIDE 25 MG/ML
6.25 INJECTION INTRAMUSCULAR; INTRAVENOUS; SUBCUTANEOUS
Status: DISCONTINUED | OUTPATIENT
Start: 2020-08-12 | End: 2020-08-12 | Stop reason: HOSPADM

## 2020-08-12 RX ORDER — ONDANSETRON 2 MG/ML
INJECTION INTRAMUSCULAR; INTRAVENOUS PRN
Status: DISCONTINUED | OUTPATIENT
Start: 2020-08-12 | End: 2020-08-12 | Stop reason: SURG

## 2020-08-12 RX ORDER — ACETAMINOPHEN 500 MG
1000 TABLET ORAL EVERY 6 HOURS
Status: DISCONTINUED | OUTPATIENT
Start: 2020-08-12 | End: 2020-08-14 | Stop reason: HOSPADM

## 2020-08-12 RX ORDER — OXYCODONE HCL 5 MG/5 ML
5 SOLUTION, ORAL ORAL
Status: DISCONTINUED | OUTPATIENT
Start: 2020-08-12 | End: 2020-08-12 | Stop reason: HOSPADM

## 2020-08-12 RX ORDER — ROCURONIUM BROMIDE 10 MG/ML
INJECTION, SOLUTION INTRAVENOUS PRN
Status: DISCONTINUED | OUTPATIENT
Start: 2020-08-12 | End: 2020-08-12 | Stop reason: SURG

## 2020-08-12 RX ORDER — DEXTROSE MONOHYDRATE 25 G/50ML
50 INJECTION, SOLUTION INTRAVENOUS
Status: DISCONTINUED | OUTPATIENT
Start: 2020-08-12 | End: 2020-08-14 | Stop reason: HOSPADM

## 2020-08-12 RX ORDER — CLONIDINE HYDROCHLORIDE 0.1 MG/1
0.1 TABLET ORAL EVERY 4 HOURS PRN
Status: DISCONTINUED | OUTPATIENT
Start: 2020-08-12 | End: 2020-08-14 | Stop reason: HOSPADM

## 2020-08-12 RX ORDER — OXYCODONE HYDROCHLORIDE 10 MG/1
10 TABLET ORAL
Status: DISCONTINUED | OUTPATIENT
Start: 2020-08-12 | End: 2020-08-14 | Stop reason: HOSPADM

## 2020-08-12 RX ORDER — LORAZEPAM 2 MG/ML
1-2 INJECTION INTRAMUSCULAR
Status: COMPLETED | OUTPATIENT
Start: 2020-08-12 | End: 2020-08-12

## 2020-08-12 RX ORDER — AMOXICILLIN 250 MG
1 CAPSULE ORAL
Status: DISCONTINUED | OUTPATIENT
Start: 2020-08-12 | End: 2020-08-14 | Stop reason: HOSPADM

## 2020-08-12 RX ORDER — SODIUM CHLORIDE AND POTASSIUM CHLORIDE 150; 900 MG/100ML; MG/100ML
INJECTION, SOLUTION INTRAVENOUS CONTINUOUS
Status: DISCONTINUED | OUTPATIENT
Start: 2020-08-12 | End: 2020-08-13

## 2020-08-12 RX ORDER — ACETAMINOPHEN 500 MG
1000 TABLET ORAL ONCE
Status: COMPLETED | OUTPATIENT
Start: 2020-08-12 | End: 2020-08-12

## 2020-08-12 RX ORDER — LOSARTAN POTASSIUM 50 MG/1
50 TABLET ORAL
Status: DISCONTINUED | OUTPATIENT
Start: 2020-08-13 | End: 2020-08-14 | Stop reason: HOSPADM

## 2020-08-12 RX ORDER — HYDROMORPHONE HYDROCHLORIDE 1 MG/ML
0.1 INJECTION, SOLUTION INTRAMUSCULAR; INTRAVENOUS; SUBCUTANEOUS
Status: DISCONTINUED | OUTPATIENT
Start: 2020-08-12 | End: 2020-08-12 | Stop reason: HOSPADM

## 2020-08-12 RX ORDER — OXYCODONE HCL 5 MG/5 ML
10 SOLUTION, ORAL ORAL
Status: DISCONTINUED | OUTPATIENT
Start: 2020-08-12 | End: 2020-08-12 | Stop reason: HOSPADM

## 2020-08-12 RX ORDER — SCOLOPAMINE TRANSDERMAL SYSTEM 1 MG/1
1 PATCH, EXTENDED RELEASE TRANSDERMAL ONCE
Status: DISCONTINUED | OUTPATIENT
Start: 2020-08-12 | End: 2020-08-14 | Stop reason: HOSPADM

## 2020-08-12 RX ORDER — ONDANSETRON 2 MG/ML
4 INJECTION INTRAMUSCULAR; INTRAVENOUS
Status: COMPLETED | OUTPATIENT
Start: 2020-08-12 | End: 2020-08-12

## 2020-08-12 RX ORDER — HYDROCORTISONE 20 MG/1
20 TABLET ORAL 2 TIMES DAILY
Status: DISCONTINUED | OUTPATIENT
Start: 2020-08-16 | End: 2020-08-14 | Stop reason: HOSPADM

## 2020-08-12 RX ORDER — HYDRALAZINE HYDROCHLORIDE 20 MG/ML
10 INJECTION INTRAMUSCULAR; INTRAVENOUS
Status: DISCONTINUED | OUTPATIENT
Start: 2020-08-12 | End: 2020-08-14 | Stop reason: HOSPADM

## 2020-08-12 RX ORDER — OXYCODONE HYDROCHLORIDE 5 MG/1
5 TABLET ORAL
Status: DISCONTINUED | OUTPATIENT
Start: 2020-08-12 | End: 2020-08-14 | Stop reason: HOSPADM

## 2020-08-12 RX ORDER — HALOPERIDOL 5 MG/ML
1 INJECTION INTRAMUSCULAR
Status: DISCONTINUED | OUTPATIENT
Start: 2020-08-12 | End: 2020-08-12 | Stop reason: HOSPADM

## 2020-08-12 RX ORDER — HYDRALAZINE HYDROCHLORIDE 20 MG/ML
5 INJECTION INTRAMUSCULAR; INTRAVENOUS
Status: DISCONTINUED | OUTPATIENT
Start: 2020-08-12 | End: 2020-08-12 | Stop reason: HOSPADM

## 2020-08-12 RX ORDER — BUPIVACAINE HYDROCHLORIDE AND EPINEPHRINE 5; 5 MG/ML; UG/ML
INJECTION, SOLUTION EPIDURAL; INTRACAUDAL; PERINEURAL
Status: DISCONTINUED | OUTPATIENT
Start: 2020-08-12 | End: 2020-08-12 | Stop reason: HOSPADM

## 2020-08-12 RX ORDER — LABETALOL HYDROCHLORIDE 5 MG/ML
10 INJECTION, SOLUTION INTRAVENOUS
Status: DISCONTINUED | OUTPATIENT
Start: 2020-08-12 | End: 2020-08-14 | Stop reason: HOSPADM

## 2020-08-12 RX ORDER — BACITRACIN ZINC 500 [USP'U]/G
OINTMENT TOPICAL
Status: DISCONTINUED | OUTPATIENT
Start: 2020-08-12 | End: 2020-08-12 | Stop reason: HOSPADM

## 2020-08-12 RX ORDER — ENEMA 19; 7 G/133ML; G/133ML
1 ENEMA RECTAL
Status: DISCONTINUED | OUTPATIENT
Start: 2020-08-12 | End: 2020-08-14 | Stop reason: HOSPADM

## 2020-08-12 RX ORDER — ONDANSETRON 2 MG/ML
4 INJECTION INTRAMUSCULAR; INTRAVENOUS EVERY 4 HOURS PRN
Status: DISCONTINUED | OUTPATIENT
Start: 2020-08-12 | End: 2020-08-14 | Stop reason: HOSPADM

## 2020-08-12 RX ORDER — CEFAZOLIN SODIUM 2 G/100ML
2 INJECTION, SOLUTION INTRAVENOUS EVERY 8 HOURS
Status: COMPLETED | OUTPATIENT
Start: 2020-08-12 | End: 2020-08-13

## 2020-08-12 RX ORDER — LIDOCAINE HYDROCHLORIDE AND EPINEPHRINE 10; 10 MG/ML; UG/ML
INJECTION, SOLUTION INFILTRATION; PERINEURAL
Status: DISCONTINUED | OUTPATIENT
Start: 2020-08-12 | End: 2020-08-12 | Stop reason: HOSPADM

## 2020-08-12 RX ORDER — FAMOTIDINE 20 MG/1
20 TABLET, FILM COATED ORAL 2 TIMES DAILY
Status: DISCONTINUED | OUTPATIENT
Start: 2020-08-12 | End: 2020-08-14 | Stop reason: HOSPADM

## 2020-08-12 RX ORDER — DIPHENHYDRAMINE HYDROCHLORIDE 50 MG/ML
12.5 INJECTION INTRAMUSCULAR; INTRAVENOUS
Status: DISCONTINUED | OUTPATIENT
Start: 2020-08-12 | End: 2020-08-12 | Stop reason: HOSPADM

## 2020-08-12 RX ORDER — REMIFENTANIL HYDROCHLORIDE 1 MG/ML
INJECTION, POWDER, LYOPHILIZED, FOR SOLUTION INTRAVENOUS PRN
Status: DISCONTINUED | OUTPATIENT
Start: 2020-08-12 | End: 2020-08-12 | Stop reason: SURG

## 2020-08-12 RX ORDER — HYDROMORPHONE HYDROCHLORIDE 1 MG/ML
0.2 INJECTION, SOLUTION INTRAMUSCULAR; INTRAVENOUS; SUBCUTANEOUS
Status: DISCONTINUED | OUTPATIENT
Start: 2020-08-12 | End: 2020-08-12 | Stop reason: HOSPADM

## 2020-08-12 RX ORDER — DIPHENHYDRAMINE HYDROCHLORIDE 50 MG/ML
25 INJECTION INTRAMUSCULAR; INTRAVENOUS EVERY 6 HOURS PRN
Status: DISCONTINUED | OUTPATIENT
Start: 2020-08-12 | End: 2020-08-14 | Stop reason: HOSPADM

## 2020-08-12 RX ORDER — MORPHINE SULFATE 4 MG/ML
2-4 INJECTION, SOLUTION INTRAMUSCULAR; INTRAVENOUS
Status: DISCONTINUED | OUTPATIENT
Start: 2020-08-12 | End: 2020-08-14 | Stop reason: HOSPADM

## 2020-08-12 RX ORDER — LIDOCAINE HYDROCHLORIDE 20 MG/ML
INJECTION, SOLUTION EPIDURAL; INFILTRATION; INTRACAUDAL; PERINEURAL PRN
Status: DISCONTINUED | OUTPATIENT
Start: 2020-08-12 | End: 2020-08-12 | Stop reason: SURG

## 2020-08-12 RX ORDER — CELECOXIB 200 MG/1
200 CAPSULE ORAL ONCE
Status: COMPLETED | OUTPATIENT
Start: 2020-08-12 | End: 2020-08-12

## 2020-08-12 RX ORDER — ATORVASTATIN CALCIUM 20 MG/1
40 TABLET, FILM COATED ORAL DAILY
Status: DISCONTINUED | OUTPATIENT
Start: 2020-08-12 | End: 2020-08-14 | Stop reason: HOSPADM

## 2020-08-12 RX ORDER — HYDROMORPHONE HYDROCHLORIDE 1 MG/ML
0.4 INJECTION, SOLUTION INTRAMUSCULAR; INTRAVENOUS; SUBCUTANEOUS
Status: DISCONTINUED | OUTPATIENT
Start: 2020-08-12 | End: 2020-08-12 | Stop reason: HOSPADM

## 2020-08-12 RX ADMIN — HYDROCORTISONE SODIUM SUCCINATE 100 MG: 100 INJECTION, POWDER, FOR SOLUTION INTRAMUSCULAR; INTRAVENOUS at 11:25

## 2020-08-12 RX ADMIN — CELECOXIB 200 MG: 200 CAPSULE ORAL at 08:08

## 2020-08-12 RX ADMIN — HYDROCORTISONE SODIUM SUCCINATE 100 MG: 100 INJECTION, POWDER, FOR SOLUTION INTRAMUSCULAR; INTRAVENOUS at 21:48

## 2020-08-12 RX ADMIN — DOCUSATE SODIUM 50 MG AND SENNOSIDES 8.6 MG 1 TABLET: 8.6; 5 TABLET, FILM COATED ORAL at 21:52

## 2020-08-12 RX ADMIN — SCOLOPAMINE TRANSDERMAL SYSTEM 1 PATCH: 1 PATCH, EXTENDED RELEASE TRANSDERMAL at 09:56

## 2020-08-12 RX ADMIN — FENTANYL CITRATE 50 MCG: 50 INJECTION INTRAMUSCULAR; INTRAVENOUS at 14:08

## 2020-08-12 RX ADMIN — POTASSIUM CHLORIDE AND SODIUM CHLORIDE: 900; 150 INJECTION, SOLUTION INTRAVENOUS at 16:26

## 2020-08-12 RX ADMIN — LORAZEPAM 1 MG: 2 INJECTION INTRAMUSCULAR; INTRAVENOUS at 08:28

## 2020-08-12 RX ADMIN — ACETAMINOPHEN 1000 MG: 500 TABLET ORAL at 08:09

## 2020-08-12 RX ADMIN — REMIFENTANIL HYDROCHLORIDE 0.1 MCG/KG/MIN: 1 INJECTION, POWDER, LYOPHILIZED, FOR SOLUTION INTRAVENOUS at 11:24

## 2020-08-12 RX ADMIN — CEFAZOLIN SODIUM 2 G: 2 INJECTION, SOLUTION INTRAVENOUS at 21:49

## 2020-08-12 RX ADMIN — ACETAMINOPHEN 1000 MG: 500 TABLET ORAL at 17:47

## 2020-08-12 RX ADMIN — GADOTERIDOL 18 ML: 279.3 INJECTION, SOLUTION INTRAVENOUS at 09:11

## 2020-08-12 RX ADMIN — FAMOTIDINE 20 MG: 20 TABLET, FILM COATED ORAL at 17:47

## 2020-08-12 RX ADMIN — LIDOCAINE HYDROCHLORIDE 0.5 ML: 10 INJECTION, SOLUTION EPIDURAL; INFILTRATION; INTRACAUDAL at 08:11

## 2020-08-12 RX ADMIN — LIDOCAINE HYDROCHLORIDE 100 MG: 20 INJECTION, SOLUTION EPIDURAL; INFILTRATION; INTRACAUDAL at 11:26

## 2020-08-12 RX ADMIN — GABAPENTIN 300 MG: 300 CAPSULE ORAL at 08:09

## 2020-08-12 RX ADMIN — DOCUSATE SODIUM 100 MG: 100 CAPSULE, LIQUID FILLED ORAL at 17:47

## 2020-08-12 RX ADMIN — CEFTRIAXONE SODIUM 2 G: 1 INJECTION, POWDER, FOR SOLUTION INTRAMUSCULAR; INTRAVENOUS at 11:25

## 2020-08-12 RX ADMIN — ONDANSETRON 4 MG: 2 INJECTION INTRAMUSCULAR; INTRAVENOUS at 13:42

## 2020-08-12 RX ADMIN — ONDANSETRON 4 MG: 2 INJECTION INTRAMUSCULAR; INTRAVENOUS at 14:09

## 2020-08-12 RX ADMIN — MORPHINE SULFATE 4 MG: 4 INJECTION INTRAVENOUS at 16:25

## 2020-08-12 RX ADMIN — PROPOFOL 200 MG: 10 INJECTION, EMULSION INTRAVENOUS at 11:25

## 2020-08-12 RX ADMIN — SODIUM CHLORIDE, POTASSIUM CHLORIDE, SODIUM LACTATE AND CALCIUM CHLORIDE: 600; 310; 30; 20 INJECTION, SOLUTION INTRAVENOUS at 13:42

## 2020-08-12 RX ADMIN — REMIFENTANIL HYDROCHLORIDE 100 MCG: 1 INJECTION, POWDER, LYOPHILIZED, FOR SOLUTION INTRAVENOUS at 11:24

## 2020-08-12 RX ADMIN — ROCURONIUM BROMIDE 50 MG: 10 INJECTION, SOLUTION INTRAVENOUS at 11:25

## 2020-08-12 RX ADMIN — FENTANYL CITRATE 50 MCG: 50 INJECTION INTRAMUSCULAR; INTRAVENOUS at 14:32

## 2020-08-12 RX ADMIN — HYDROMORPHONE HYDROCHLORIDE 0.2 MG: 1 INJECTION, SOLUTION INTRAMUSCULAR; INTRAVENOUS; SUBCUTANEOUS at 15:22

## 2020-08-12 RX ADMIN — HYDROCORTISONE SODIUM SUCCINATE 100 MG: 100 INJECTION, POWDER, FOR SOLUTION INTRAMUSCULAR; INTRAVENOUS at 16:45

## 2020-08-12 RX ADMIN — SODIUM CHLORIDE, POTASSIUM CHLORIDE, SODIUM LACTATE AND CALCIUM CHLORIDE: 600; 310; 30; 20 INJECTION, SOLUTION INTRAVENOUS at 08:11

## 2020-08-12 RX ADMIN — POVIDONE-IODINE 15 ML: 10 SOLUTION TOPICAL at 08:09

## 2020-08-12 RX ADMIN — OXYCODONE 5 MG: 5 TABLET ORAL at 21:51

## 2020-08-12 RX ADMIN — PHENYLEPHRINE HYDROCHLORIDE 40 MCG/MIN: 10 INJECTION INTRAVENOUS at 11:24

## 2020-08-12 ASSESSMENT — FIBROSIS 4 INDEX
FIB4 SCORE: 0.63
FIB4 SCORE: 0.63

## 2020-08-12 ASSESSMENT — ENCOUNTER SYMPTOMS
DEPRESSION: 0
PALPITATIONS: 0
CHILLS: 0
SHORTNESS OF BREATH: 0
VOMITING: 0
NAUSEA: 1
SEIZURES: 0
BLURRED VISION: 1
FEVER: 0
SPUTUM PRODUCTION: 0
DOUBLE VISION: 0
PHOTOPHOBIA: 0
SORE THROAT: 0
MYALGIAS: 0
ABDOMINAL PAIN: 0
SENSORY CHANGE: 0
FOCAL WEAKNESS: 0

## 2020-08-12 ASSESSMENT — PAIN SCALES - GENERAL: PAIN_LEVEL: 4

## 2020-08-12 NOTE — ANESTHESIA TIME REPORT
Anesthesia Start and Stop Event Times     Date Time Event    8/12/2020 1100 Ready for Procedure     1119 Anesthesia Start     1356 Anesthesia Stop        Responsible Staff  08/12/20    Name Role Begin End    Janey Smith M.D. Anesth 1119 1356        Preop Diagnosis (Free Text):  Pre-op Diagnosis     PITUITARY MACROADENOMA        Preop Diagnosis (Codes):    Post op Diagnosis  Pituitary adenoma (HCC)      Premium Reason  Non-Premium    Comments:

## 2020-08-12 NOTE — ANESTHESIA PROCEDURE NOTES
Airway    Date/Time: 8/12/2020 11:27 AM  Performed by: Janey Smith M.D.  Authorized by: Janey Smith M.D.     Location:  OR  Urgency:  Elective  Indications for Airway Management:  Anesthesia      Spontaneous Ventilation: absent    Sedation Level:  Deep  Preoxygenated: Yes    Patient Position:  Ramp  Mask Difficulty Assessment:  0 - not attempted  Final Airway Type:  Endotracheal airway  Final Endotracheal Airway:  ETT  Cuffed: Yes    Technique Used for Successful ETT Placement:  Direct laryngoscopy  Devices/Methods Used in Placement:  Intubating stylet    Insertion Site:  Oral  Blade Type:  Yesi  Laryngoscope Blade/Videolaryngoscope Blade Size:  4  ETT Size (mm):  7.5  Leak Pressue (cm H2O):  35  Measured from:  Teeth  ETT to Teeth (cm):  21  Placement Verified by: auscultation and capnometry    Cormack-Lehane Classification:  Grade IIb - view of arytenoids or posterior of glottis only  Number of Attempts at Approach:  1

## 2020-08-12 NOTE — ASSESSMENT & PLAN NOTE
Status post transsphenoidal resection 8/12 with Dr. Reyez  Serial neurologic examination  No antiplatelet or anticoagulant therapies at this time  Monitor for development of adrenal insufficiency as well as DI  Monitor BMP and urine specific gravity  Tapering dose hydrocortisone  Strict I's and O's  Stat repeat imaging with any decline in neurologic status

## 2020-08-12 NOTE — CONSULTS
Critical Care Consultation    Date of consult: 8/12/2020    Referring Physician  Harish Reyez M.D.    Reason for Consultation  Pituitary adenoma    History of Presenting Illness  55 y.o. male with known pituitary adenoma 2.6 x 1.9 x 1.8 cm, hypertension, type 2 diabetes, and HLD who presented 8/12/2020 for transsphenoidal resection of pituitary adenoma.  Patient returns postoperatively from PACU without significant complaint.  Patient indicates his adenoma was incidentally found and was otherwise asymptomatic    Code Status  Full Code    Review of Systems  Review of Systems   Constitutional: Negative for chills and fever.   HENT: Positive for congestion. Negative for sore throat.    Eyes: Positive for blurred vision. Negative for double vision and photophobia.   Respiratory: Negative for sputum production and shortness of breath.    Cardiovascular: Negative for chest pain and palpitations.   Gastrointestinal: Positive for nausea. Negative for abdominal pain and vomiting.   Musculoskeletal: Negative for myalgias.   Neurological: Negative for sensory change, focal weakness and seizures.   Psychiatric/Behavioral: Negative for depression.   All other systems reviewed and are negative.      Past Medical History   has a past medical history of Allergy, Cancer (HCC) (2019), Diabetes (HCC), High cholesterol, Hypertension, and Psychiatric problem.    Surgical History   has a past surgical history that includes other orthopedic surgery; other; pr repair of nasal septum (8/12/2020); transsphenoidal resection (8/12/2020); ethmoidectomy (Bilateral, 8/12/2020); sphenoidectomy (8/12/2020); antrostomy (8/12/2020); and nasal endoscopy (8/12/2020).    Family History  family history includes Cancer in his father; Hypertension in his sister; Migraines in his mother; No Known Problems in his brother.    Social History   reports that he has never smoked. He has never used smokeless tobacco. He reports current alcohol use. He reports  that he does not use drugs.    Medications  Home Medications     Reviewed by Maude Daniel R.N. (Registered Nurse) on 08/12/20 at 0803  Med List Status: Complete   Medication Last Dose Status   atorvastatin (LIPITOR) 40 MG Tab 8/11/2020 Active   losartan (COZAAR) 50 MG Tab 8/11/2020 Active   metFORMIN (GLUCOPHAGE) 500 MG Tab 8/11/2020 Active   Multiple Vitamins-Minerals (CENTRUM SILVER 50+MEN PO) 7/30/2020 Active              Current Facility-Administered Medications   Medication Dose Route Frequency Provider Last Rate Last Dose   • lactated ringers infusion   Intravenous Continuous Harish Reyez M.D.   Stopped at 08/12/20 1353   • scopolamine (TRANSDERM-SCOP) patch 1 Patch  1 Patch Transdermal Once Janey Smith M.D.   1 Patch at 08/12/20 0956   • 0.9 % NaCl with KCl 20 mEq infusion   Intravenous Continuous JUVENCIO Roy.P.R.N.       • Pharmacy Consult Request ...Pain Management Review 1 Each  1 Each Other PHARMACY TO DOSE JUVENCIO Roy.P.R.N.       • MD ALERT...DO NOT ADMINISTER NSAIDS or ASPIRIN unless ORDERED By Neurosurgery 1 Each  1 Each Other PRN JUVENCIO Roy.P.R.N.       • ondansetron (ZOFRAN) syringe/vial injection 4 mg  4 mg Intravenous Q4HRS PRN Juliann Turner A.P.R.N.       • diphenhydrAMINE (BENADRYL) injection 25 mg  25 mg Intravenous Q6HRS PRN Juliann Turner, A.P.R.N.       • labetalol (NORMODYNE/TRANDATE) injection 10 mg  10 mg Intravenous Q HOUR PRN Juliann Turner, A.P.R.N.       • hydrALAZINE (APRESOLINE) injection 10 mg  10 mg Intravenous Q HOUR PRN Juliann Turner A.P.R.N.       • cloNIDine (CATAPRES) tablet 0.1 mg  0.1 mg Oral Q4HRS PRN Juliann Turner A.P.R.N.       • docusate sodium (COLACE) capsule 100 mg  100 mg Oral BID JUVENCIO Roy.P.R.N.       • senna-docusate (PERICOLACE or SENOKOT S) 8.6-50 MG per tablet 1 Tab  1 Tab Oral Nightly Juliann Turner A.P.R.N.       • senna-docusate (PERICOLACE or SENOKOT S) 8.6-50 MG per tablet 1  Tab  1 Tab Oral Q24HRS PRN Juliann Turner A.P.R.N.       • polyethylene glycol/lytes (MIRALAX) PACKET 1 Packet  1 Packet Oral BID PRN JUVENCIO Roy.P.R.N.       • magnesium hydroxide (MILK OF MAGNESIA) suspension 30 mL  30 mL Oral QDAY PRN Juliann Turner A.P.R.N.       • bisacodyl (DULCOLAX) suppository 10 mg  10 mg Rectal Q24HRS PRN JUVENCIO Roy.P.R.N.       • fleet enema 133 mL  1 Each Rectal Once PRN JUVENCIO Roy.P.R.N.       • acetaminophen (TYLENOL) tablet 1,000 mg  1,000 mg Oral Q6HRS Juliann Turner, A.P.R.N.       • oxyCODONE immediate-release (ROXICODONE) tablet 5 mg  5 mg Oral Q3HRS PRN Juliann Turner A.P.R.N.       • oxyCODONE immediate-release (ROXICODONE) tablet 10 mg  10 mg Oral Q3HRS PRN Juliann Turner, A.P.R.N.       • morphine (pf) 4 MG/ML injection 2-4 mg  2-4 mg Intravenous Q3HRS PRN Juliann Turner, A.P.R.N.       • ceFAZolin in dextrose (ANCEF) IVPB premix 2 g  2 g Intravenous Q8HR JUVENCIO Roy.P.R.N.       • hydrocortisone sodium succinate PF (SOLU-CORTEF) 100 MG injection 100 mg  100 mg Intravenous Q8HRS Juliann Turner, A.P.R.N.        Followed by   • [START ON 8/13/2020] hydrocortisone sodium succinate PF (SOLU-CORTEF) 100 MG injection 50 mg  50 mg Intravenous BID Juliann Turner A.P.R.N.       • [START ON 8/16/2020] hydrocortisone (CORTEF) tablet 20 mg  20 mg Oral BID Juliann Turner, A.P.R.N.       • famotidine (PEPCID) tablet 20 mg  20 mg Oral BID Juliann Turner, A.P.R.N.        Or   • famotidine (PEPCID) injection 20 mg  20 mg Intravenous BID JARROD RoyP.R.N.       • insulin regular (HumuLIN R,NovoLIN R) injection  2-9 Units Subcutaneous 4X/DAY ACHS Juliann Turner A.P.R.N.        And   • glucose 4 g chewable tablet 16 g  16 g Oral Q15 MIN PRN JUVENCIO Roy.P.R.N.        And   • dextrose 50% (D50W) injection 50 mL  50 mL Intravenous Q15 MIN PRN JUVENCIO Roy.P.R.N.       • atorvastatin (LIPITOR)  tablet 40 mg  40 mg Oral DAILY NYASIA RoyRCarlosN.       • losartan (COZAAR) tablet 50 mg  50 mg Oral Q DAY GET Roy.           Allergies  No Known Allergies    Vital Signs last 24 hours  Temp:  [35.8 °C (96.5 °F)-37.1 °C (98.7 °F)] 37.1 °C (98.7 °F)  Pulse:  [] 82  Resp:  [10-19] 17  BP: (122-141)/(79-95) 123/82  SpO2:  [96 %-100 %] 100 %    Physical Exam  Physical Exam  Vitals signs and nursing note reviewed.   Constitutional:       Appearance: He is not diaphoretic.   HENT:      Head: Normocephalic.      Comments: Nasal packing/dressing in place     Mouth/Throat:      Pharynx: Oropharynx is clear.   Eyes:      Conjunctiva/sclera: Conjunctivae normal.      Pupils: Pupils are equal, round, and reactive to light.   Cardiovascular:      Rate and Rhythm: Normal rate.      Pulses: Normal pulses.   Pulmonary:      Breath sounds: No wheezing or rales.   Abdominal:      General: There is no distension.      Palpations: Abdomen is soft.      Tenderness: There is no abdominal tenderness.   Musculoskeletal:         General: No swelling.   Skin:     General: Skin is warm and dry.   Neurological:      General: No focal deficit present.      Mental Status: He is alert.      Cranial Nerves: No cranial nerve deficit.   Psychiatric:         Mood and Affect: Mood normal.         Fluids    Intake/Output Summary (Last 24 hours) at 8/12/2020 1612  Last data filed at 8/12/2020 1558  Gross per 24 hour   Intake 1250 ml   Output 450 ml   Net 800 ml       Laboratory  Recent Results (from the past 48 hour(s))   ACCU-CHEK GLUCOSE    Collection Time: 08/12/20  8:06 AM   Result Value Ref Range    Glucose - Accu-Ck 108 (H) 65 - 99 mg/dL   ABO Rh Confirm    Collection Time: 08/12/20  8:08 AM   Result Value Ref Range    ABO Rh Confirm O POS    Histology Request    Collection Time: 08/12/20  2:42 PM   Result Value Ref Range    Pathology Request Sent to Histo        Imaging  CT-STEALTH HEAD W/O   Final Result      1.   There is an ovoid pituitary mass again noted. There is no definite adjacent bony destruction.   2.  There is no acute intracranial process.      MR-STEALTH BRAIN WITH & W/O   Final Result      Pituitary macroadenoma.The lesion is causing mass effect upon the left cavernous sinus. The left internal carotid flow voids are seen lateral to the mass.                   Assessment/Plan  Pituitary adenoma (HCC)- (present on admission)  Assessment & Plan  Status post transsphenoidal resection 8/12 with Dr. Reyez  Serial neurologic examination  No antiplatelet or anticoagulant therapies at this time  Monitor for development of adrenal insufficiency as well as DI  Monitor BMP and urine specific gravity  Tapering dose hydrocortisone  Strict I's and O's  Stat repeat imaging with any decline in neurologic status    Pure hypercholesterolemia- (present on admission)  Assessment & Plan  Resume home statin    Essential hypertension- (present on admission)  Assessment & Plan  Continue home regimen  PRN's to maintain SBP less than 160    Controlled type 2 diabetes mellitus without complication, without long-term current use of insulin (HCC)- (present on admission)  Assessment & Plan  SSI and FS BS  Check A1c      Discussed patient condition and risk of morbidity and/or mortality with RN, RT and Patient.

## 2020-08-12 NOTE — ANESTHESIA PREPROCEDURE EVALUATION
54yo M w/pituitary adenoma here for trans-sphenoidal resection    No Known Allergies     Relevant Problems   ANESTHESIA (within normal limits)      PULMONARY (within normal limits)      CARDIAC   (+) Essential hypertension      ENDO   (+) Controlled type 2 diabetes mellitus without complication, without long-term current use of insulin (HCC)      Other   (+) Mild episode of recurrent major depressive disorder (HCC)   (+) Pituitary adenoma (HCC)     Past Medical History:   Diagnosis Date   • Allergy    • Cancer (HCC) 2019    skin   • Diabetes (HCC)     oral meds   • High cholesterol    • Hypertension    • Psychiatric problem     depression      No current facility-administered medications on file prior to encounter.      Current Outpatient Medications on File Prior to Encounter   Medication Sig Dispense Refill   • metFORMIN (GLUCOPHAGE) 500 MG Tab Take 1 Tab by mouth every day. 90 Tab 1   • atorvastatin (LIPITOR) 40 MG Tab TAKE 1 TABLET BY MOUTH ONCE DAILY AT BEDTIME 90 Tab 3   • losartan (COZAAR) 50 MG Tab TAKE 1 TABLET BY MOUTH ONCE DAILY 90 Tab 3      Past Surgical History:   Procedure Laterality Date   • OTHER      several ankle surgeries   • OTHER ORTHOPEDIC SURGERY      left ankle fusion      Vitals:    08/12/20 0733   BP: 133/95   Pulse: 77   Resp: 17   Temp: 36.2 °C (97.2 °F)   SpO2: 96%      Physical Exam    Airway   Mallampati: I  TM distance: >3 FB  Neck ROM: full       Cardiovascular - normal exam     Dental - normal exam           Pulmonary - normal exam     Abdominal    Neurological - normal exam                 Anesthesia Plan    ASA 3 (pituitary adenoma)   ASA physical status 3 criteria: other (comment)    Plan - general       Airway plan will be ETT      Plan Factors:   Patient was previously instructed to abstain from smoking on day of procedure.  Patient did not smoke on day of procedure.      Induction: intravenous    Postoperative Plan: Postoperative administration of opioids is  intended.    Pertinent diagnostic labs and testing reviewed    Informed Consent:    Anesthetic plan and risks discussed with patient.    Use of blood products discussed with: patient whom consented to blood products.

## 2020-08-12 NOTE — OP REPORT
NEUROSURGERY OPERATIVE NOTE  DATE:  2:01 PM 2020    PATIENT NAME:  Victor Hugo Sherwood   1965 MRN [id number]      PROCEDURE:  1.  Endoscopic transsphenoidal resection pituitary adenoma  2.  Stealth intraoperative navigation    Surgeon:  Harish Reyez MD, PhD  Co-surgeon: KALI Pierre.  Utilization of co-surgeon was critical for the performance of this procedure.  The provided endoscopic approach to the anterior wall of the sella, participated in resection of the tumor with holding the endoscope providing visualization.    Anesthesia:  GETA    Diagnosis: Pituitary macroadenoma    Indication: 55-year-old male with 3 cm pituitary macroadenoma.  This was noted incidentally during evaluation for sinus disease.  Given the size, endoscopic resection is planned.    Procedure:  The patient was identified in the holding area, and the surgery site marked, consent was obtained.  The patient was brought back to the operating room and intubated by anesthesia service.  Augmentin and 100 mcg of hydrocortisone were administered intravenously.  P was transferred to the operating room table in a supine manner and all pressure points well padded.  His forehead was prepped with ChloraPrep.  A small transverse incision in the mid forehead was planned for placement of the Stealth registration frame.  The skin was infiltrated with 0.5% Marcaine with epinephrine, incised sharply in transverse manner.  The [facility] skull registration platform was secured to his head using provided screws.  3-0 nylon sutures were placed through the incision.  Good hemostasis was noted.  His face was registered to the [facility] intraoperative navigation system using tracer methodology.  Good registration was verified.  His abdomen was prepped with ChloraPrep in the periumbilical region.  His face was prepped with Betadine.  Sterile drapes were applied.    Endoscopic transsphenoidal approach to the posterior wall of the sphenoid sinus was  performed by Dr. Coleman North, ENT.  Please see his note for full details.  The posterior wall of the sphenoid sinus was interrogated using Stealth, the bilateral internal carotid arteries could be visualized with pulsation beneath the eggshell thin bone.  Location of the carotid arteries was verified with Stealth.  The optic recess was identified using standard landmarks and Stealth.  A box osteotomy was then created using a small osteotome and mallet.  The bone was removed, exposing the dura of the sella.  The operative site was interrogated using Stealth, again localizing the tumor and verifying the carotid arteries to be just lateral to the bony opening.  The dura was opened in a cruciate manner using an apex arachnoid knife and rotating microscissors.  Yellowish-gray amorphous gelatinous type material was present underneath, consistent with adenoma.  Specimen was obtained and sent for frozen pathology; this demonstrated sheets of amorphus cells consistent with pituitary adenoma.  Piecemeal resection of the adenoma was then performed using ring curettes and suction.  The pituitary gland was noted to descend from the superior right aspect of the sella.  The posterior border of the tumor capsule and the diaphragma sella appeared to be present as a thin translucent gray membrane, consistent with good decompression superiorly.  Decompression was carried inferiorly and laterally until no more tumor was able to be obtained.  The operative site was closely irrigated with irrigation.  Surgi-Jose R was placed in the operative site, and tamponade held for several minutes using a cottonoid carol.  The cottonoid carol was removed, demonstrating good hemostasis.  A piece of thrombin-soaked Gelfoam was placed in the operative cavity.  The bone obtained from the osteotomy was replaced, and the sellar face covered with green dural sealant (adherence, [place]).  Final closure was performed by Dr. North, please refer to separately  dictated note.    The Stealth registration frame was removed, and the incision closed using 3-0 nylon suture in an interrupted manner.  Final counts were correct.    FINDINGS: Amorphous cellular material consistent with adenoma.  Visually gross total resection achieved.  SPECIMEN: Sellar mass for frozen and permanent pathology.  Frozen pathology consistent with a amorphus cells.  DRAINS: None   EBL:  15 CC  COMPLICATIONS:  None apparent at end of procedure.

## 2020-08-12 NOTE — OP REPORT
DATE OF SERVICE:  08/12/2020    PREOPERATIVE DIAGNOSIS:  Pituitary adenoma.    POSTOPERATIVE DIAGNOSIS:  Pituitary adenoma.    PROCEDURES PERFORMED:  1.  Endoscopic transsphenoidal resection of pituitary adenoma.  2.  Left maxillary antrostomy.  3.  Left total ethmoidectomy.  4.  Right posterior ethmoidectomy.  5.  Bilateral sphenoidotomy.  6.  With image guidance.    SURGEON:  Coleman North MD    ASSISTANT:  None.    ANESTHESIA:  General endotracheal.    ANESTHESIOLOGIST:  Janey Smith MD    COSURGEON:  Harish Reyez MD    ESTIMATED BLOOD LOSS:  50 mL.    SPECIMENS:  1.  Right and left sinus contents.  2.  Sellar mass.    INDICATIONS:  This is a 55-year-old gentleman who incidentally had a pituitary   mass noted on his neck.  MRI ordered by my partner, Dr. Tijerina.  He was   referred to Dr. Reyez who elected to have him undergo resection.  Dr. Reyez asked me to assist with the approach.    FINDINGS:  There was a rightward septal deviation.  Dr. Reyez had good   pituitary tumor resection.  There was no spinal fluid leak.  Skull base was   reconstructed with a piece of Gelfoam in the intradurally and then a piece of   the bone from overlying the sella rotated in and then Adherus was placed over   this and then finger cots in the nasal cavity.    PROCEDURE:  Patient was brought to the operating room, intubated by   anesthesia, given preoperative ceftriaxone and stress dose steroids.  A   time-out was performed, 1:1000 epinephrine-soaked pledgets stained with   fluorescein were placed in the nasal cavity.  Once vasoconstriction was   allowed to take effect, I injected the right and left maxillary lines and   septum with 1% lidocaine, 1:100,000 epinephrine.  I placed pledgets again.  I   registered the image guidance.  It was required for this case because of the   skull base tumor.  He was found to be in good fidelity in all 3 planes.  I   began sinus surgery on the left hand side and medialized the  middle turbinate.    I resected the uncinate with pediatric backbiter, microdebrider, and   Kerrisons.  I identified the natural ostium and widened it posteriorly using a   curved scissor.  I then used a side biter to widen it further.  I entered the   bulla and resected with spoon and microdebrider.  I went through the basal   lamella at the intersection of the horizontal and vertical portions.  I   entered the posterior ethmoid cells, identified the natural ostium of the   sphenoid.  After I resected the inferior aspect of the superior turbinate, I   then widened the sphenoidotomy using up Kerrisons.  I preserved the flaps on   both sides.  I could now see the sella.  I continued to widen the   sphenoidotomy.  I then reflected the flap inferiorly using a sickle knife and   a Roxy.  I then turned my attention to the right side and medialized the   middle turbinate and superior turbinate.  The rostrum was wider on this side,   so I could not visualize the natural opening easily, so I entered the   posterior ethmoid cells on this side.  I resected them.  I now continued to   visualize the sphenoid ostium.  I widened this medially using the Kerrisons.    I preserved the pedicle on this side as well with a sickle knife and sharp and   reflecting it inferiorly.  I then went through the septum and performed a   posterior septectomy using a straight through cutting instruments and   Kerrisons.  I then resected the intersphenoid septum where it went to the   right using through-cutting instruments.  At this point, Dr. Reyez was happy   with the exposure.  He then performed the tumor resection.  Please see his   note for further details.  He reconstructed the skull base.  When he was done,   I placed a bacitracin-coated finger cot in the left middle meatus and the   right nasal cavity and secured to each other.  The patient was then awakened   from anesthesia and taken to recovery room in stable condition.  Counts were    correct.       ____________________________________     MD INOCENCIA Portillo / HUSSEIN    DD:  08/12/2020 13:51:22  DT:  08/12/2020 16:17:42    D#:  [id number]  Job#:  [id number]

## 2020-08-12 NOTE — OR SURGEON
Immediate Post OP Note    PreOp Diagnosis: Pituitary adenoma    PostOp Diagnosis: same     Procedure(s):  1. Transsphenoidal pituitary tumor resection, endoscopic  2. L max   3. L total ethmoid  4. B sphenoid   5. R posterior ethmoid  6. With IGS     Surgeon(s):  RAJENDRA Chu M.D.    Anesthesiologist/Type of Anesthesia:  Anesthesiologist: Janey Smith M.D./General    Surgical Staff:  Circulator: Jourdan Storm R.N.  Scrub Person: Kirsten Alves  [facility] : Jonh Mcmahon R.N.    Specimens removed if any:  ID Type Source Tests Collected by Time Destination   A : sellar mass frozen Tissue Nose PATHOLOGY SPECIMEN Harish Reyez M.D. 8/12/2020  1:12 PM    B : sellar mass Tissue Nose PATHOLOGY SPECIMEN Harish Reyez M.D. 8/12/2020  1:30 PM    C : nasal contents Tissue Nose PATHOLOGY SPECIMEN Harish Reyez M.D. 8/12/2020  1:30 PM        Estimated Blood Loss: 50cc    Findings: good tumor resection, no leak     Complications: none        8/12/2020 1:45 PM Coleman North M.D.

## 2020-08-12 NOTE — ANESTHESIA PROCEDURE NOTES
Arterial Line  Performed by: Janey Smith M.D.  Authorized by: Janey Smith M.D.     Start Time:  8/12/2020 11:32 AM  Localization: ultrasound guidance  Image captured, interpreted and electronically stored.  Patient Location:  OR  Indication: continuous blood pressure monitoring        Catheter Size:  20 G  Seldinger Technique?: Yes    Laterality:  Left  Site:  Radial artery  Line Secured:  Antimicrobial disc, tape and transparent dressing  Events: patient tolerated procedure well with no complications     Single direct puncture under ultrasound guidance without use of through-and-through technique

## 2020-08-12 NOTE — OR NURSING
1357: Pt arrives to PACU awake and alert. Packing dressing to nose and incision to forehead CDI.Oral suction as needed. Left radial artline in use.    1408: Pt c/o pain- medicated per MAR.    1410: Dr. Reyez at bedside. Pt is to go to ICU.     1420: Neuro assessment complete. Pt neurologically intact.    1445: Pt states pain is better 4/10 at this time and declines further pain meds.     1525: Spoke to pts wife Gillian and updated her on pt status.    1545: Pt sleeping comfortably at this time. Wife updated on room assignment.     1550: Report given to Maci PLAZA. Pt going down with RN on a zoll.

## 2020-08-12 NOTE — PROGRESS NOTES
Pt arrived via PACU team on monitor. VSS. Attached to RICU monitor, neuro assessment complete. Orders reviewed. Dr. Smith to bedside.

## 2020-08-12 NOTE — ANESTHESIA POSTPROCEDURE EVALUATION
Patient: Victor Hugo Sherwood    Procedure Summary     Date: 08/12/20 Room / Location: [facility] 05 / SURGERY [facility]    Anesthesia Start: 1119 Anesthesia Stop: 1356    Procedures:       HYPOPHYSECTOMY, TRANSSPHENOIDAL APPROACH - STEALTH AXIOM GUIDED ENDOSCOPIC TRANSSPHENOIDAL RESECTION OF PITUITARY ADENOMA, POSS LUMBAR DRAIN (Nose)      SEPTOPLASTY, NOSE - SUBMUCOUS RESECTION WITH OR WITHOUT CARTILAGE SCORING/CONTOURING/GRAFT AND STEREOTACTIC COMPUTER-ASSISTED (NAVIGATIONAL) PROCEDURE, CRANIAL, EXTRADURAL (Nose)      ETHMOIDECTOMY - TOTAL (Bilateral Nose)      SPHENOIDECTOMY - ENDOSCOPIC SPHENOIDOTOMY WITH TISSUE REMOVAL (Nose)      MAXILLARY ANTROSTOMY W/TISSUE REMOVAL (Nose)      ENDOSCOPY, NOSE - FRONTAL SINUS EXPLORATION (Nose) Diagnosis: (PITUITARY MACROADENOMA)    Surgeon: Harish Reyez M.D.; Coleman North M.D. Responsible Provider: Janey Smith M.D.    Anesthesia Type: general ASA Status: 3          Final Anesthesia Type: general  Last vitals  BP   Blood Pressure: 131/79, Arterial BP: 148/77    Temp   36.4 °C (97.6 °F)    Pulse   Pulse: 83   Resp   13    SpO2   100 %      Anesthesia Post Evaluation    Patient location during evaluation: PACU  Patient participation: complete - patient participated  Level of consciousness: awake and alert  Pain score: 4    Airway patency: patent  Anesthetic complications: no  Cardiovascular status: hemodynamically stable  Respiratory status: acceptable  Hydration status: euvolemic    PONV: none

## 2020-08-13 ENCOUNTER — APPOINTMENT (OUTPATIENT)
Dept: RADIOLOGY | Facility: MEDICAL CENTER | Age: 55
DRG: 615 | End: 2020-08-13
Attending: NEUROLOGICAL SURGERY
Payer: COMMERCIAL

## 2020-08-13 LAB
ANION GAP SERPL CALC-SCNC: 14 MMOL/L (ref 7–16)
BUN SERPL-MCNC: 16 MG/DL (ref 8–22)
CALCIUM SERPL-MCNC: 8.6 MG/DL (ref 8.5–10.5)
CHLORIDE SERPL-SCNC: 106 MMOL/L (ref 96–112)
CO2 SERPL-SCNC: 16 MMOL/L (ref 20–33)
CREAT SERPL-MCNC: 0.71 MG/DL (ref 0.5–1.4)
ERYTHROCYTE [DISTWIDTH] IN BLOOD BY AUTOMATED COUNT: 40.4 FL (ref 35.9–50)
GLUCOSE BLD-MCNC: 133 MG/DL (ref 65–99)
GLUCOSE BLD-MCNC: 134 MG/DL (ref 65–99)
GLUCOSE BLD-MCNC: 140 MG/DL (ref 65–99)
GLUCOSE BLD-MCNC: 184 MG/DL (ref 65–99)
GLUCOSE SERPL-MCNC: 162 MG/DL (ref 65–99)
HCT VFR BLD AUTO: 39.7 % (ref 42–52)
HGB BLD-MCNC: 13 G/DL (ref 14–18)
MCH RBC QN AUTO: 30 PG (ref 27–33)
MCHC RBC AUTO-ENTMCNC: 32.7 G/DL (ref 33.7–35.3)
MCV RBC AUTO: 91.7 FL (ref 81.4–97.8)
PLATELET # BLD AUTO: 258 K/UL (ref 164–446)
PMV BLD AUTO: 10.5 FL (ref 9–12.9)
POTASSIUM SERPL-SCNC: 4.5 MMOL/L (ref 3.6–5.5)
RBC # BLD AUTO: 4.33 M/UL (ref 4.7–6.1)
SODIUM SERPL-SCNC: 136 MMOL/L (ref 135–145)
WBC # BLD AUTO: 13.2 K/UL (ref 4.8–10.8)

## 2020-08-13 PROCEDURE — 770006 HCHG ROOM/CARE - MED/SURG/GYN SEMI*

## 2020-08-13 PROCEDURE — 97161 PT EVAL LOW COMPLEX 20 MIN: CPT

## 2020-08-13 PROCEDURE — 700117 HCHG RX CONTRAST REV CODE 255: Performed by: NEUROLOGICAL SURGERY

## 2020-08-13 PROCEDURE — 700102 HCHG RX REV CODE 250 W/ 637 OVERRIDE(OP): Performed by: OTOLARYNGOLOGY

## 2020-08-13 PROCEDURE — 82962 GLUCOSE BLOOD TEST: CPT | Mod: 91

## 2020-08-13 PROCEDURE — 700101 HCHG RX REV CODE 250: Performed by: NURSE PRACTITIONER

## 2020-08-13 PROCEDURE — 85027 COMPLETE CBC AUTOMATED: CPT

## 2020-08-13 PROCEDURE — 700112 HCHG RX REV CODE 229: Performed by: NURSE PRACTITIONER

## 2020-08-13 PROCEDURE — A9270 NON-COVERED ITEM OR SERVICE: HCPCS | Performed by: NURSE PRACTITIONER

## 2020-08-13 PROCEDURE — 700102 HCHG RX REV CODE 250 W/ 637 OVERRIDE(OP): Performed by: NEUROLOGICAL SURGERY

## 2020-08-13 PROCEDURE — 700111 HCHG RX REV CODE 636 W/ 250 OVERRIDE (IP): Performed by: NURSE PRACTITIONER

## 2020-08-13 PROCEDURE — A9270 NON-COVERED ITEM OR SERVICE: HCPCS | Performed by: OTOLARYNGOLOGY

## 2020-08-13 PROCEDURE — A9576 INJ PROHANCE MULTIPACK: HCPCS | Performed by: NEUROLOGICAL SURGERY

## 2020-08-13 PROCEDURE — 700111 HCHG RX REV CODE 636 W/ 250 OVERRIDE (IP): Performed by: PSYCHIATRY & NEUROLOGY

## 2020-08-13 PROCEDURE — A9270 NON-COVERED ITEM OR SERVICE: HCPCS | Performed by: NEUROLOGICAL SURGERY

## 2020-08-13 PROCEDURE — 70553 MRI BRAIN STEM W/O & W/DYE: CPT | Mod: MG

## 2020-08-13 PROCEDURE — 80048 BASIC METABOLIC PNL TOTAL CA: CPT

## 2020-08-13 PROCEDURE — 700102 HCHG RX REV CODE 250 W/ 637 OVERRIDE(OP): Performed by: NURSE PRACTITIONER

## 2020-08-13 PROCEDURE — 97165 OT EVAL LOW COMPLEX 30 MIN: CPT

## 2020-08-13 RX ORDER — LORAZEPAM 2 MG/ML
1 INJECTION INTRAMUSCULAR ONCE
Status: COMPLETED | OUTPATIENT
Start: 2020-08-13 | End: 2020-08-13

## 2020-08-13 RX ORDER — ECHINACEA PURPUREA EXTRACT 125 MG
2 TABLET ORAL
Status: DISCONTINUED | OUTPATIENT
Start: 2020-08-13 | End: 2020-08-14 | Stop reason: HOSPADM

## 2020-08-13 RX ORDER — ECHINACEA PURPUREA EXTRACT 125 MG
2 TABLET ORAL
Status: DISCONTINUED | OUTPATIENT
Start: 2020-08-13 | End: 2020-08-13

## 2020-08-13 RX ADMIN — ACETAMINOPHEN 1000 MG: 500 TABLET ORAL at 04:58

## 2020-08-13 RX ADMIN — FAMOTIDINE 20 MG: 20 TABLET, FILM COATED ORAL at 04:58

## 2020-08-13 RX ADMIN — ACETAMINOPHEN 1000 MG: 500 TABLET ORAL at 13:44

## 2020-08-13 RX ADMIN — DOCUSATE SODIUM 100 MG: 100 CAPSULE, LIQUID FILLED ORAL at 04:58

## 2020-08-13 RX ADMIN — SALINE NASAL SPRAY 2 SPRAY: 1.5 SOLUTION NASAL at 18:04

## 2020-08-13 RX ADMIN — OXYCODONE 5 MG: 5 TABLET ORAL at 13:03

## 2020-08-13 RX ADMIN — POTASSIUM CHLORIDE AND SODIUM CHLORIDE: 900; 150 INJECTION, SOLUTION INTRAVENOUS at 02:38

## 2020-08-13 RX ADMIN — SALINE NASAL SPRAY 2 SPRAY: 1.5 SOLUTION NASAL at 21:51

## 2020-08-13 RX ADMIN — HYDROCORTISONE SODIUM SUCCINATE 100 MG: 100 INJECTION, POWDER, FOR SOLUTION INTRAMUSCULAR; INTRAVENOUS at 04:58

## 2020-08-13 RX ADMIN — CEFAZOLIN SODIUM 2 G: 2 INJECTION, SOLUTION INTRAVENOUS at 04:57

## 2020-08-13 RX ADMIN — DOCUSATE SODIUM 100 MG: 100 CAPSULE, LIQUID FILLED ORAL at 18:04

## 2020-08-13 RX ADMIN — GADOTERIDOL 20 ML: 279.3 INJECTION, SOLUTION INTRAVENOUS at 17:28

## 2020-08-13 RX ADMIN — ATORVASTATIN CALCIUM 40 MG: 40 TABLET, FILM COATED ORAL at 04:58

## 2020-08-13 RX ADMIN — ACETAMINOPHEN 1000 MG: 500 TABLET ORAL at 00:13

## 2020-08-13 RX ADMIN — ACETAMINOPHEN 1000 MG: 500 TABLET ORAL at 18:04

## 2020-08-13 RX ADMIN — LORAZEPAM 1 MG: 2 INJECTION INTRAMUSCULAR; INTRAVENOUS at 16:21

## 2020-08-13 RX ADMIN — DOCUSATE SODIUM 50 MG AND SENNOSIDES 8.6 MG 1 TABLET: 8.6; 5 TABLET, FILM COATED ORAL at 20:58

## 2020-08-13 RX ADMIN — INSULIN HUMAN 2 UNITS: 100 INJECTION, SOLUTION PARENTERAL at 12:18

## 2020-08-13 RX ADMIN — HYDROCORTISONE SODIUM SUCCINATE 50 MG: 100 INJECTION, POWDER, FOR SOLUTION INTRAMUSCULAR; INTRAVENOUS at 18:05

## 2020-08-13 RX ADMIN — ACETAMINOPHEN 1000 MG: 500 TABLET ORAL at 23:42

## 2020-08-13 RX ADMIN — SALINE NASAL SPRAY 2 SPRAY: 1.5 SOLUTION NASAL at 13:48

## 2020-08-13 RX ADMIN — SALINE NASAL SPRAY 2 SPRAY: 1.5 SOLUTION NASAL at 15:38

## 2020-08-13 RX ADMIN — FAMOTIDINE 20 MG: 20 TABLET, FILM COATED ORAL at 18:04

## 2020-08-13 ASSESSMENT — LIFESTYLE VARIABLES
DOES PATIENT WANT TO STOP DRINKING: NO
HAVE PEOPLE ANNOYED YOU BY CRITICIZING YOUR DRINKING: NO
EVER FELT BAD OR GUILTY ABOUT YOUR DRINKING: NO
HAVE YOU EVER FELT YOU SHOULD CUT DOWN ON YOUR DRINKING: NO
AVERAGE NUMBER OF DAYS PER WEEK YOU HAVE A DRINK CONTAINING ALCOHOL: 3
TOTAL SCORE: 0
TOTAL SCORE: 0
EVER HAD A DRINK FIRST THING IN THE MORNING TO STEADY YOUR NERVES TO GET RID OF A HANGOVER: NO
CONSUMPTION TOTAL: NEGATIVE
EVER_SMOKED: NEVER
HOW MANY TIMES IN THE PAST YEAR HAVE YOU HAD 5 OR MORE DRINKS IN A DAY: 0
ALCOHOL_USE: YES
TOTAL SCORE: 0
ON A TYPICAL DAY WHEN YOU DRINK ALCOHOL HOW MANY DRINKS DO YOU HAVE: 1

## 2020-08-13 ASSESSMENT — COGNITIVE AND FUNCTIONAL STATUS - GENERAL
MOBILITY SCORE: 24
SUGGESTED CMS G CODE MODIFIER MOBILITY: CH
SUGGESTED CMS G CODE MODIFIER DAILY ACTIVITY: CH
DAILY ACTIVITIY SCORE: 24

## 2020-08-13 ASSESSMENT — GAIT ASSESSMENTS
GAIT LEVEL OF ASSIST: SUPERVISED
DISTANCE (FEET): 200

## 2020-08-13 ASSESSMENT — PATIENT HEALTH QUESTIONNAIRE - PHQ9
SUM OF ALL RESPONSES TO PHQ9 QUESTIONS 1 AND 2: 0
2. FEELING DOWN, DEPRESSED, IRRITABLE, OR HOPELESS: NOT AT ALL
1. LITTLE INTEREST OR PLEASURE IN DOING THINGS: NOT AT ALL

## 2020-08-13 ASSESSMENT — ACTIVITIES OF DAILY LIVING (ADL): TOILETING: INDEPENDENT

## 2020-08-13 NOTE — THERAPY
Occupational Therapy   Initial Evaluation     Patient Name: Victor Hugo Sherwood  Age:  55 y.o., Sex:  male  Medical Record #: [id number]  Today's Date: 8/13/2020          Assessment  Patient is 55 y.o. male with a diagnosis of Pituitary adenoma .  OT evaluation completed. Patient is currently not being actively followed for occupational therapy services at this time. However, pt may be seen at the request of attending provider for an additional visit to address any discharge or equipment needs within 30 days from evaluation.      Plan     Occupational Therapy for Evaluation only       Discharge Recommendations: (P) Anticipate that the patient will have no further occupational therapy needs after discharge from the hospital          08/13/20 0951   Prior Living Situation   Prior Services None   Housing / Facility 1 Story House   Equipment Owned None   Lives with - Patient's Self Care Capacity Spouse   Prior Level of ADL Function   Self Feeding Independent   Grooming / Hygiene Independent   Bathing Independent   Dressing Independent   Toileting Independent   ADL Assessment   Grooming Supervision   Upper Body Dressing Supervision   Lower Body Dressing Supervision   Toileting Supervision   Functional Mobility   Sit to Stand Supervised   Bed, Chair, Wheelchair Transfer Supervised

## 2020-08-13 NOTE — THERAPY
Physical Therapy   Initial Evaluation     Patient Name: Victor Hugo Sherwood  Age:  55 y.o., Sex:  male  Medical Record #: [id number]  Today's Date: 8/13/2020     Precautions: Fall Risk    Assessment  Patient is 55 y.o. male with a diagnosis of Endoscopic transsphenoidal resection pituitary adenoma.  Additional factors influencing patient status / progress: pt is supervised for OOB, transfers, and ambulation.  No further inpt PT needs.      Plan    Recommend Physical Therapy for Evaluation only    DC Equipment Recommendations: None  Discharge Recommendations: Anticipate that the patient will have no further physical therapy needs after discharge from the hospital            Objective       08/13/20 1026   Prior Living Situation   Housing / Facility [address] [place] [address]   Steps Into Home 1  (shallow threshold)   Steps In Home 0   Rail None   Elevator No   Equipment Owned Single Point Cane;Front-Wheel Walker   Lives with - Patient's Self Care Capacity Spouse   Prior Level of Functional Mobility   Bed Mobility Independent   Transfer Status Independent   Ambulation Independent   Stairs Independent   Gait Analysis   Gait Level Of Assist Supervised   Assistive Device None   Distance (Feet) 200   # of Times Distance was Traveled 1   Deviation   (walks on toes on L, chronic since L ankle fusion)   Bed Mobility    Supine to Sit Supervised   Sit to Supine Supervised   Scooting Supervised   Rolling Supervised   Functional Mobility   Sit to Stand Supervised   Bed, Chair, Wheelchair Transfer Supervised   Anticipated Discharge Equipment and Recommendations   DC Equipment Recommendations None   Discharge Recommendations Anticipate that the patient will have no further physical therapy needs after discharge from the hospital

## 2020-08-13 NOTE — PROGRESS NOTES
Surgery Otolaryngology Daily Progress Note    Date of Service  8/13/2020    Chief Complaint  55 y.o. male admitted 8/12/2020 with   POD 1 s/p TSS  Doing well - vision ok   No bleeding     Review of Systems  ROS     Physical Exam  Temp:  [35.8 °C (96.5 °F)-37.1 °C (98.7 °F)] 37.1 °C (98.7 °F)  Pulse:  [] 76  Resp:  [10-35] 26  BP: (107-141)/(62-88) 122/68  SpO2:  [88 %-100 %] 95 %    Physical Exam  nad  Packing removed     Fluids    Intake/Output Summary (Last 24 hours) at 8/13/2020 1213  Last data filed at 8/13/2020 1000  Gross per 24 hour   Intake 4106.67 ml   Output 2960 ml   Net 1146.67 ml       Laboratory  Recent Labs     08/13/20  0400   WBC 13.2*   RBC 4.33*   HEMOGLOBIN 13.0*   HEMATOCRIT 39.7*   MCV 91.7   MCH 30.0   MCHC 32.7*   RDW 40.4   PLATELETCT 258   MPV 10.5     Recent Labs     08/13/20  0400   SODIUM 136   POTASSIUM 4.5   CHLORIDE 106   CO2 16*   GLUCOSE 162*   BUN 16   CREATININE 0.71   CALCIUM 8.6                    Pod 1 s/p TSS  Follow up 1 week   Saline spray 5/day

## 2020-08-13 NOTE — CARE PLAN
Problem: Communication  Goal: The ability to communicate needs accurately and effectively will improve  Outcome: PROGRESSING AS EXPECTED  Intervention: Educate patient and significant other/support system about the plan of care, procedures, treatments, medications and allow for questions  Note: Patient's white board is updated. Patient is updated on plan of care. All questions were answered.        Problem: Knowledge Deficit  Goal: Knowledge of disease process/condition, treatment plan, diagnostic tests, and medications will improve  Outcome: PROGRESSING AS EXPECTED  Intervention: Explain information regarding disease process/condition, treatment plan, diagnostic tests, and medications and document in education  Note: Pt educated about disease process and plan of care for the day.  Pt verbalized understanding.

## 2020-08-14 VITALS
HEART RATE: 52 BPM | TEMPERATURE: 96.9 F | RESPIRATION RATE: 16 BRPM | WEIGHT: 199.52 LBS | BODY MASS INDEX: 29.55 KG/M2 | OXYGEN SATURATION: 98 % | DIASTOLIC BLOOD PRESSURE: 100 MMHG | SYSTOLIC BLOOD PRESSURE: 130 MMHG | HEIGHT: 69 IN

## 2020-08-14 LAB
ANION GAP SERPL CALC-SCNC: 11 MMOL/L (ref 7–16)
BUN SERPL-MCNC: 16 MG/DL (ref 8–22)
CALCIUM SERPL-MCNC: 8.9 MG/DL (ref 8.5–10.5)
CHLORIDE SERPL-SCNC: 109 MMOL/L (ref 96–112)
CO2 SERPL-SCNC: 22 MMOL/L (ref 20–33)
CREAT SERPL-MCNC: 0.94 MG/DL (ref 0.5–1.4)
ERYTHROCYTE [DISTWIDTH] IN BLOOD BY AUTOMATED COUNT: 41.9 FL (ref 35.9–50)
GLUCOSE BLD-MCNC: 133 MG/DL (ref 65–99)
GLUCOSE BLD-MCNC: 168 MG/DL (ref 65–99)
GLUCOSE SERPL-MCNC: 118 MG/DL (ref 65–99)
HCT VFR BLD AUTO: 41.4 % (ref 42–52)
HGB BLD-MCNC: 13.4 G/DL (ref 14–18)
MCH RBC QN AUTO: 29.9 PG (ref 27–33)
MCHC RBC AUTO-ENTMCNC: 32.4 G/DL (ref 33.7–35.3)
MCV RBC AUTO: 92.4 FL (ref 81.4–97.8)
PLATELET # BLD AUTO: 268 K/UL (ref 164–446)
PMV BLD AUTO: 10.5 FL (ref 9–12.9)
POTASSIUM SERPL-SCNC: 4 MMOL/L (ref 3.6–5.5)
RBC # BLD AUTO: 4.48 M/UL (ref 4.7–6.1)
SODIUM SERPL-SCNC: 142 MMOL/L (ref 135–145)
WBC # BLD AUTO: 18.9 K/UL (ref 4.8–10.8)

## 2020-08-14 PROCEDURE — 85027 COMPLETE CBC AUTOMATED: CPT

## 2020-08-14 PROCEDURE — 700111 HCHG RX REV CODE 636 W/ 250 OVERRIDE (IP): Performed by: NURSE PRACTITIONER

## 2020-08-14 PROCEDURE — 36415 COLL VENOUS BLD VENIPUNCTURE: CPT

## 2020-08-14 PROCEDURE — 82962 GLUCOSE BLOOD TEST: CPT

## 2020-08-14 PROCEDURE — A9270 NON-COVERED ITEM OR SERVICE: HCPCS | Performed by: NURSE PRACTITIONER

## 2020-08-14 PROCEDURE — 700102 HCHG RX REV CODE 250 W/ 637 OVERRIDE(OP): Performed by: NURSE PRACTITIONER

## 2020-08-14 PROCEDURE — A9270 NON-COVERED ITEM OR SERVICE: HCPCS | Performed by: NEUROLOGICAL SURGERY

## 2020-08-14 PROCEDURE — 700102 HCHG RX REV CODE 250 W/ 637 OVERRIDE(OP): Performed by: NEUROLOGICAL SURGERY

## 2020-08-14 PROCEDURE — 700112 HCHG RX REV CODE 229: Performed by: NURSE PRACTITIONER

## 2020-08-14 PROCEDURE — 80048 BASIC METABOLIC PNL TOTAL CA: CPT

## 2020-08-14 RX ORDER — ACETAMINOPHEN 500 MG
1000 TABLET ORAL EVERY 6 HOURS PRN
COMMUNITY
Start: 2020-08-14 | End: 2020-11-09

## 2020-08-14 RX ORDER — HYDROCORTISONE 20 MG/1
20 TABLET ORAL 2 TIMES DAILY
Qty: 30 TAB | Refills: 1
Start: 2020-08-14 | End: 2020-11-09

## 2020-08-14 RX ADMIN — HYDROCORTISONE SODIUM SUCCINATE 50 MG: 100 INJECTION, POWDER, FOR SOLUTION INTRAMUSCULAR; INTRAVENOUS at 05:20

## 2020-08-14 RX ADMIN — INSULIN HUMAN 2 UNITS: 100 INJECTION, SOLUTION PARENTERAL at 13:18

## 2020-08-14 RX ADMIN — ATORVASTATIN CALCIUM 40 MG: 40 TABLET, FILM COATED ORAL at 05:25

## 2020-08-14 RX ADMIN — ACETAMINOPHEN 1000 MG: 500 TABLET ORAL at 05:19

## 2020-08-14 RX ADMIN — LOSARTAN POTASSIUM 50 MG: 50 TABLET, FILM COATED ORAL at 05:20

## 2020-08-14 RX ADMIN — FAMOTIDINE 20 MG: 20 TABLET, FILM COATED ORAL at 05:19

## 2020-08-14 RX ADMIN — ACETAMINOPHEN 1000 MG: 500 TABLET ORAL at 13:16

## 2020-08-14 RX ADMIN — SALINE NASAL SPRAY 2 SPRAY: 1.5 SOLUTION NASAL at 03:26

## 2020-08-14 RX ADMIN — SALINE NASAL SPRAY 2 SPRAY: 1.5 SOLUTION NASAL at 13:15

## 2020-08-14 RX ADMIN — DOCUSATE SODIUM 100 MG: 100 CAPSULE, LIQUID FILLED ORAL at 05:19

## 2020-08-14 NOTE — PROGRESS NOTES
Neurosurgery Progress Note    Subjective:  No acute events    Exam:  Awake alert interactive   pupils 3 mm midline reactive conjugate gaze  Bilateral  bicep IP DF 5/5  Sensation intact touch x 4 extremities  Scalp incision c/d/i with suture    BP  Min: 130/78  Max: 148/89  Pulse  Av.5  Min: 60  Max: 76  Resp  Av.8  Min: 15  Max: 26  Temp  Av.6 °C (97.9 °F)  Min: 36.5 °C (97.7 °F)  Max: 36.7 °C (98.1 °F)  Monitored Temp 2  Av.2 °C (99 °F)  Min: 37.2 °C (99 °F)  Max: 37.2 °C (99 °F)  SpO2  Av.3 %  Min: 95 %  Max: 96 %    No data recorded    Recent Labs     20  0400 20  0540   WBC 13.2* 18.9*   RBC 4.33* 4.48*   HEMOGLOBIN 13.0* 13.4*   HEMATOCRIT 39.7* 41.4*   MCV 91.7 92.4   MCH 30.0 29.9   MCHC 32.7* 32.4*   RDW 40.4 41.9   PLATELETCT 258 268   MPV 10.5 10.5     Recent Labs     20  0400 20  0540   SODIUM 136 142   POTASSIUM 4.5 4.0   CHLORIDE 106 109   CO2 16* 22   GLUCOSE 162* 118*   BUN 16 16   CREATININE 0.71 0.94   CALCIUM 8.6 8.9               Intake/Output       20 - 20 0659 20 - 08/15/20 0659       Total  Total       Intake    P.O.  1300  -- 1300  --  -- --    P.O. 1300 -- 1300 -- -- --    I.V.  430  -- 430  --  -- --    Volume (mL) (0.9 % NaCl with KCl 20 mEq infusion) 430 -- 430 -- -- --    Volume (mL) (lactated ringers infusion) 0 -- 0 -- -- --    Total Intake 1730 -- 1730 -- -- --       Output    Urine  1100  2700 3800  --  -- --    Output (mL) (Urethral Catheter Latex;Temperature probe 16 Fr.) 1100 2700 3800 -- -- --    Total Output 1100 2700 3800 -- -- --       Net I/O     343 -0685 - -- -- --            Intake/Output Summary (Last 24 hours) at 2020 0946  Last data filed at 2020 0400  Gross per 24 hour   Intake 1230 ml   Output 3500 ml   Net -2270 ml            • sodium chloride  2 Spray 5X/DAY   • scopolamine  1 Patch Once   • Pharmacy Consult Request  1 Each PHARMACY TO  DOSE   • MD ALERT...DO NOT ADMINISTER NSAIDS or ASPIRIN unless ORDERED By Neurosurgery  1 Each PRN   • ondansetron  4 mg Q4HRS PRN   • diphenhydrAMINE  25 mg Q6HRS PRN   • labetalol  10 mg Q HOUR PRN   • hydrALAZINE  10 mg Q HOUR PRN   • cloNIDine  0.1 mg Q4HRS PRN   • docusate sodium  100 mg BID   • senna-docusate  1 Tab Nightly   • senna-docusate  1 Tab Q24HRS PRN   • polyethylene glycol/lytes  1 Packet BID PRN   • magnesium hydroxide  30 mL QDAY PRN   • bisacodyl  10 mg Q24HRS PRN   • fleet  1 Each Once PRN   • acetaminophen  1,000 mg Q6HRS   • oxyCODONE immediate-release  5 mg Q3HRS PRN   • oxyCODONE immediate release  10 mg Q3HRS PRN   • morphine injection  2-4 mg Q3HRS PRN   • hydrocortisone sodium succinate PF  50 mg BID   • [START ON 8/16/2020] hydrocortisone  20 mg BID   • famotidine  20 mg BID   • insulin regular  2-9 Units 4X/DAY ACHS    And   • glucose  16 g Q15 MIN PRN    And   • dextrose 50%  50 mL Q15 MIN PRN   • atorvastatin  40 mg DAILY   • losartan  50 mg Q DAY       Assessment and Plan:  Hospital day #1 endoscopic transsphenoidal resection pituitary adenoma  POD #1  Prophylactic anticoagulation: no         Start date/time: tbd  Doing well  No evidence of DI  If no increase in urine output this afternoon ok to d/c orellana and transfer to floor  Hydrocortisone to 50 mg bid

## 2020-08-14 NOTE — PROGRESS NOTES
Discharge document and RX was provided and explained to pt. Pt verbalized understanding. IV removed with tip intact. Pt discharged home with all personal belongings.

## 2020-08-14 NOTE — CARE PLAN
Problem: Infection  Goal: Will remain free from infection  Outcome: PROGRESSING AS EXPECTED   No s/s of the pt developing an infection at this time.   Problem: Venous Thromboembolism (VTW)/Deep Vein Thrombosis (DVT) Prevention:  Goal: Patient will participate in Venous Thrombosis (VTE)/Deep Vein Thrombosis (DVT)Prevention Measures  Outcome: PROGRESSING AS EXPECTED   The pt has his SCDs on and ambulates occasionally with staff.   Problem: Respiratory:  Goal: Respiratory status will improve  Outcome: PROGRESSING AS EXPECTED   The pt is not having any issues with breathing at this time and he has a normal oxygen saturation on room air.

## 2020-08-14 NOTE — PROGRESS NOTES
Neurosurgery Progress Note    Subjective:  No acute events. Sitting up in bed, no complaints. Wants orellana cath removed    Exam:  Awake alert interactive   pupils 3 mm midline reactive conjugate gaze  Bilateral  bicep IP DF 5/5  Sensation intact touch x 4 extremities  Scalp incision c/d/i with suture    BP  Min: 130/78  Max: 156/92  Pulse  Av  Min: 60  Max: 74  Resp  Av.3  Min: 15  Max: 18  Temp  Av.6 °C (97.9 °F)  Min: 36.5 °C (97.7 °F)  Max: 36.7 °C (98.1 °F)  SpO2  Av.3 %  Min: 95 %  Max: 96 %    No data recorded    Recent Labs     20  0400 20  0540   WBC 13.2* 18.9*   RBC 4.33* 4.48*   HEMOGLOBIN 13.0* 13.4*   HEMATOCRIT 39.7* 41.4*   MCV 91.7 92.4   MCH 30.0 29.9   MCHC 32.7* 32.4*   RDW 40.4 41.9   PLATELETCT 258 268   MPV 10.5 10.5     Recent Labs     20  0400 20  0540   SODIUM 136 142   POTASSIUM 4.5 4.0   CHLORIDE 106 109   CO2 16* 22   GLUCOSE 162* 118*   BUN 16 16   CREATININE 0.71 0.94   CALCIUM 8.6 8.9               Intake/Output       20 0700 - 20 0659 20 07 - 08/15/20 0659      1900-0659 Total  Total       Intake    P.O.  1300  -- 1300  --  -- --    P.O. 1300 -- 1300 -- -- --    I.V.  430  -- 430  --  -- --    Volume (mL) (0.9 % NaCl with KCl 20 mEq infusion) 430 -- 430 -- -- --    Volume (mL) (lactated ringers infusion) 0 -- 0 -- -- --    Total Intake 1730 -- 1730 -- -- --       Output    Urine  1100  2700 3800  --  -- --    Output (mL) (Urethral Catheter Latex;Temperature probe 16 Fr.) 1100 2700 3800 -- -- --    Total Output 1100 2700 3800 -- -- --       Net I/O     630 -0370 -532 -- -- --            Intake/Output Summary (Last 24 hours) at 2020 1029  Last data filed at 2020 0400  Gross per 24 hour   Intake 1030 ml   Output 3275 ml   Net -2245 ml            • sodium chloride  2 Spray 5X/DAY   • scopolamine  1 Patch Once   • Pharmacy Consult Request  1 Each PHARMACY TO DOSE   • MD ALERT...DO NOT  ADMINISTER NSAIDS or ASPIRIN unless ORDERED By Neurosurgery  1 Each PRN   • ondansetron  4 mg Q4HRS PRN   • diphenhydrAMINE  25 mg Q6HRS PRN   • labetalol  10 mg Q HOUR PRN   • hydrALAZINE  10 mg Q HOUR PRN   • cloNIDine  0.1 mg Q4HRS PRN   • docusate sodium  100 mg BID   • senna-docusate  1 Tab Nightly   • senna-docusate  1 Tab Q24HRS PRN   • polyethylene glycol/lytes  1 Packet BID PRN   • magnesium hydroxide  30 mL QDAY PRN   • bisacodyl  10 mg Q24HRS PRN   • fleet  1 Each Once PRN   • acetaminophen  1,000 mg Q6HRS   • oxyCODONE immediate-release  5 mg Q3HRS PRN   • oxyCODONE immediate release  10 mg Q3HRS PRN   • morphine injection  2-4 mg Q3HRS PRN   • hydrocortisone sodium succinate PF  50 mg BID   • [START ON 8/16/2020] hydrocortisone  20 mg BID   • famotidine  20 mg BID   • insulin regular  2-9 Units 4X/DAY ACHS    And   • glucose  16 g Q15 MIN PRN    And   • dextrose 50%  50 mL Q15 MIN PRN   • atorvastatin  40 mg DAILY   • losartan  50 mg Q DAY       Assessment and Plan:  Hospital day #2 endoscopic transsphenoidal resection pituitary adenoma  POD #2  Prophylactic anticoagulation: no         Start date/time: tbd    Plan:  DC pt today  DC orellana  Prescriptions eprescribed to pharmacy     -hydrocortisone 20mg BID    -Augmentin 875mg BID x10 days  OTC Tylenol for pain  F/u in clinic in 2 weeks for suture removal

## 2020-08-14 NOTE — DISCHARGE INSTRUCTIONS
Discharge Instructions    Discharged to home by car with relative. Discharged via wheelchair, hospital escort: Yes.  Special equipment needed: Not Applicable    Be sure to schedule a follow-up appointment with your primary care doctor or any specialists as instructed.     Discharge Plan:   Diet Plan: (P) Discussed  Activity Level: (P) Discussed  Confirmed Follow up Appointment: (P) Patient to Call and Schedule Appointment  Confirmed Symptoms Management: (P) Discussed  Medication Reconciliation Updated: (P) Yes    I understand that a diet low in cholesterol, fat, and sodium is recommended for good health. Unless I have been given specific instructions below for another diet, I accept this instruction as my diet prescription.     Special Instructions    Ok to shower, pat incisions dry, leave open to air- no dressing   No Aspirin or NSAIDS (Aleve, Motrin, Advil) until cleared by Dr. Reyez   No driving while on narcotic medication   Over the counter stool softeners daily while on narcotics   Continue Hydrocortisone medication until discontinued by Endocrinology   Take antibiotics until prescription completed.   Over the counter saline spray ( Ocean or similar) 2-3 times per day.   No nose blowing, sneeze with mouth open   Avoid strenuous activity or heavy lifting for 4-6 weeks   Question: Against Medical Advice Answer: No       Transsphenoidal Pituitary Tumor Resection    A transsphenoidal pituitary tumor resection is the surgical removal of a tumor located on the pituitary gland. The pituitary gland is a pea-sized gland located in the base of the skull, behind the bridge of the nose. It controls important functions in the body, including the production of chemicals that regulate certain body functions (hormones).  Pituitary tumors are rarely cancerous, and many can be treated with medicines. However, a tumor may need to be removed if it causes overproduction of certain hormones that cannot be controlled with  medicine. A tumor may also be removed if it presses on nearby structures. During a transsphenoidal pituitary tumor resection, the tumor is accessed and removed through the nose and the hollow area behind the nose (sphenoid sinus). MRIs may be used to help guide the procedure.  Tell a health care provider about:  · Any allergies you have.  · All medicines you are taking, including vitamins, herbs, eye drops, creams, and over-the-counter medicines.  · Any problems you or family members have had with anesthetic medicines.  · Any blood disorders you have.  · Any surgeries you have had.  · Any medical conditions you have.  · Whether you are pregnant or may be pregnant.  What are the risks?  Generally, this is a safe procedure. However, problems may occur, including:  · Infection.  · Bleeding or a blood clot.  · Allergic reactions to medicines or dyes.  · Damage to other structures or organs, such as surrounding brain tissue.  · Scarring  The following complications are possible but rare:  · Leakage of cerebrospinal fluid.  · Vision loss.  · Damage to the pituitary gland.  · Temporary diabetes insipidus.  · Infection of the membranes that line the brain and spinal cord (meningitis).  · Long-term sinus stuffiness (chronic congestion).  · Nasal deformity.  · Nasal bleeding.  · Stroke.  What happens before the procedure?  Staying hydrated  Follow instructions from your health care provider about hydration, which may include:  · Up to 2 hours before the procedure - you may continue to drink clear liquids, such as water, clear fruit juice, black coffee, and plain tea.  Eating and drinking restrictions  Follow instructions from your health care provider about eating and drinking, which may include:  · 8 hours before the procedure - stop eating heavy meals or foods such as meat, fried foods, or fatty foods.  · 6 hours before the procedure - stop eating light meals or foods, such as toast or cereal.  · 4 hours before the  procedure - stop drinking milk or drinks that contain milk.  · 2 hours before the procedure - stop drinking clear liquids.  Medicines  · Ask your health care provider about:  ? Changing or stopping your regular medicines. This is especially important if you are taking diabetes medicines or blood thinners.  ? Taking over-the-counter medicines, vitamins, herbs, and supplements.  ? Taking medicines such as aspirin and ibuprofen. These medicines can thin your blood. Do not take these medicines unless your health care provider tells you to take them.  · You may be given antibiotic medicine to help prevent infection.  General instructions  · Ask your health care provider how your surgical site will be marked or identified.  · You may be asked to shower with a germ-killing soap.  · You may have blood tests.  · You may have imaging tests, such as CT scan or MRI.  · Plan to have someone take you home from the hospital or clinic.  · Before your procedure, do not use any products that contain nicotine or tobacco for as long as possible. This includes cigarettes and e-cigarettes. If you need help quitting, ask your health care provider.  What happens during the procedure?  · To lower your risk of infection:  ? Your health care team will wash or sanitize their hands.  ? Hair may be removed from the surgical area.  · An IV will be inserted into one of your veins.  · You will be given one or more of the following:  ? A medicine to make you relax (sedative).  ? A medicine to make you fall asleep (general anesthetic).  · Your face and nostrils will be cleaned with an antiseptic solution.  · The surgery is usually performed through your nostrils and your sphenoid sinus. In some cases, an incision may be made under your lip or near your nose to help perform the surgery.  · Tubes that have small surgical instruments attached will be placed through your nostrils or through your incision.  · A hole will be made in the skull bone at the  back of your nose.  · The surgical instruments will be moved to your pituitary gland and the tumor will be removed.  · The area will be examined for other abnormalities or other tumor growth.  · Fat tissue may be taken from your abdomen and used to fill the empty space left by the removed tumor.  · Your skull bone will be closed using a patch of substitute material.  · Soft splints will be placed in your nostrils to keep them open and allow for drainage after surgery.  · If an incision was made, a bandage (dressing) may be placed over it.  The procedure may vary among health care providers and hospitals.  What happens after the procedure?  · Your blood pressure, heart rate, breathing rate, and blood oxygen level will be monitored until the medicines you were given have worn off.  · You will have pain and swelling of the nose. You will be given medicine as needed.  · You will have fluid and blood draining from your nostrils. Your soft splints will be changed as needed.  · You will be given medicine to prevent vomiting if you feel that you have an upset stomach or have to vomit (nausea).  · You may continue to receive fluids and medicines through an IV.  · You will have blood tests to monitor your hormones.  · You may have a stuffy nose (nasal congestion).  Summary  · A transsphenoidal pituitary tumor resection is the surgical removal of a tumor located on the pituitary gland.  · During a transsphenoidal pituitary tumor resection, the tumor is accessed and removed through the nose and the hollow area behind the nose (sphenoid sinus).  · After the procedure, you will have soft splints in your nostril and you may have a stuffy nose (nasal congestion).  · You will be given medicine to relieve the pain and swelling of your nose.  This information is not intended to replace advice given to you by your health care provider. Make sure you discuss any questions you have with your health care provider.  Document Released:  10/08/2014 Document Revised: 11/30/2018 Document Reviewed: 03/22/2018  [employer] Patient Education © 2020 [employer]      · Is patient discharged on Warfarin / Coumadin?   No     Depression / Suicide Risk    As you are discharged from this [facility] facility, it is important to learn how to keep safe from harming yourself.    Recognize the warning signs:  · Abrupt changes in personality, positive or negative- including increase in energy   · Giving away possessions  · Change in eating patterns- significant weight changes-  positive or negative  · Change in sleeping patterns- unable to sleep or sleeping all the time   · Unwillingness or inability to communicate  · Depression  · Unusual sadness, discouragement and loneliness  · Talk of wanting to die  · Neglect of personal appearance   · Rebelliousness- reckless behavior  · Withdrawal from people/activities they love  · Confusion- inability to concentrate     If you or a loved one observes any of these behaviors or has concerns about self-harm, here's what you can do:  · Talk about it- your feelings and reasons for harming yourself  · Remove any means that you might use to hurt yourself (examples: pills, rope, extension cords, firearm)  · Get professional help from the community (Mental Health, Substance Abuse, psychological counseling)  · Do not be alone:Call your Safe Contact- someone whom you trust who will be there for you.  · Call your local CRISIS HOTLINE 448-4647 or 534-623-3631  · Call your local Children's Mobile Crisis Response Team [place] (446) 829-4799 or www.TouchPal  · Call the toll free National Suicide Prevention Hotlines   · National Suicide Prevention Lifeline 995-500-KRZK (7690)  · National Hope Line Network 800-SUICIDE (387-7664)      Hydrocortisone tablets  What is this medicine?  HYDROCORTISONE (alma droe KOR ti sone) is a corticosteroid. It is commonly used to treat inflammation of the skin, joints, lungs, and other organs.  Common conditions treated include asthma, allergies, and arthritis. It is also used for other conditions, like blood disorders and diseases of the adrenal glands.  This medicine may be used for other purposes; ask your health care provider or pharmacist if you have questions.  COMMON BRAND NAME(S): Cortef, Hydrocortone  What should I tell my health care provider before I take this medicine?  They need to know if you have any of these conditions:  · Cushing's syndrome  · diabetes  · glaucoma  · heart problems or disease  · high blood pressure  · infection like herpes, measles, tuberculosis, or chickenpox  · kidney disease  · liver disease  · mental problems  · myasthenia gravis  · osteoporosis  · previous heart attack  · seizures  · stomach, ulcer or intestine disease including colitis and diverticulitis  · thyroid problem  · an unusual or allergic reaction to hydrocortisone, corticosteroids, other medicines, lactose, foods, dyes, or preservatives  · pregnant or trying to get pregnant  · breast-feeding  How should I use this medicine?  Take this medicine by mouth with a drink of water. Follow the directions on the prescription label. Take it with food or milk to avoid stomach upset. If you are taking this medicine once a day, take it in the morning. Do not take more medicine than you are told to take. Do not suddenly stop taking your medicine because you may develop a severe reaction. Your doctor will tell you how much medicine to take. If your doctor wants you to stop the medicine, the dose may be slowly lowered over time to avoid any side effects.  Talk to your pediatrician regarding the use of this medicine in children. Special care may be needed.  Overdosage: If you think you have taken too much of this medicine contact a poison control center or emergency room at once.  NOTE: This medicine is only for you. Do not share this medicine with others.  What if I miss a dose?  If you miss a dose, take it as soon as you  can. If it is almost time for your next dose, take only that dose. Do not take double or extra doses.  What may interact with this medicine?  Do not take this medicine with any of the following medications:  · mifepristone, RU-486  · vaccines  This medicine may also interact with the following medications:  · antibiotics like clarithromycin, erythromycin, and troleandomycin  · aspirin and aspirin-like drugs  · barbiturates like phenobarbital  · ketoconazole  · phenytoin  · rifampin  · warfarin  This list may not describe all possible interactions. Give your health care provider a list of all the medicines, herbs, non-prescription drugs, or dietary supplements you use. Also tell them if you smoke, drink alcohol, or use illegal drugs. Some items may interact with your medicine.  What should I watch for while using this medicine?  Visit your doctor or health care professional for regular checks on your progress. If you are taking this medicine over a prolonged period, carry an identification card with your name and address, the type and dose of your medicine, and your doctor's name and address.  This medicine may increase your risk of getting an infection. Stay away from people who are sick. Tell your doctor or health care professional if you are around anyone with measles or chickenpox.  If you are going to have surgery, tell your doctor or health care professional that you have taken this medicine within the last twelve months.  Ask your doctor or health care professional about your diet. You may need to lower the amount of salt you eat.  This medicine may increase blood sugar. Ask your healthcare provider if changes in diet or medicines are needed if you have diabetes.  What side effects may I notice from receiving this medicine?  Side effects that you should report to your doctor or health care professional as soon as possible:  · fever, sore throat, sneezing, cough, or other signs of infection, wounds that will  not heal  · mental depression, mood swings, mistaken feelings of self importance or of being mistreated  · pain in hips, back, ribs, arms, shoulders, or legs  · severe stomach pain  ·   signs and symptoms of high blood sugar such as being more thirsty or hungry or having to urinate more than normal. You may also feel very tired or have blurry vision.  · swelling of feet or lower legs  · vomiting  Side effects that usually do not require medical attention (report to your doctor or health care professional if they continue or are bothersome):  · headache  · nausea  · skin problems, acne, thin and shiny skin  This list may not describe all possible side effects. Call your doctor for medical advice about side effects. You may report side effects to FDA at 5-756-MTF-2041.  Where should I keep my medicine?  Keep out of the reach of children.  Store at room temperature between 20 and 25 degrees C (68 and 77 degrees F). Throw away any unused medicine after the expiration date.  NOTE: This sheet is a summary. It may not cover all possible information. If you have questions about this medicine, talk to your doctor, pharmacist, or health care provider.  © 2020 [employer] (2019-09-18 12:14:04)

## 2020-08-17 RX ORDER — AMOXICILLIN AND CLAVULANATE POTASSIUM 875; 125 MG/1; MG/1
1 TABLET, FILM COATED ORAL 2 TIMES DAILY
COMMUNITY
End: 2020-11-09

## 2020-08-25 NOTE — DISCHARGE SUMMARY
Discharge Summary    CHIEF COMPLAINT ON ADMISSION  Pituitary Macroadenoma    Reason for Admission  PITUITARY MACROADENOMA     Admission Date  8/12/2020    CODE STATUS  Prior    HPI & HOSPITAL COURSE  This is a 55 y.o. male admitted the date of surgery. Surgery went as planned without complication. Pt admitted to ICU for close observation post op for DI. POD #1 pt doing well and transferred out of ICU. POD #2 continues to do well, no signs of DI. Pt prepped to dc home.  Rx upon dc: hydrocortisone 20mg to be continued until f/u with endocrinology.   Augmentin x 10 days.        Therefore, he is discharged in good and stable condition to home with close outpatient follow-up.    The patient met 2-midnight criteria for an inpatient stay at the time of discharge.    Discharge Date  8/14/2020    FOLLOW UP ITEMS POST DISCHARGE  [facility]  [facility]    DISCHARGE DIAGNOSES  Active Problems:    Controlled type 2 diabetes mellitus without complication, without long-term current use of insulin (HCC) POA: Yes    Essential hypertension POA: Yes    Pure hypercholesterolemia POA: Yes    Pituitary adenoma (HCC) POA: Yes  Resolved Problems:    * No resolved hospital problems. *      FOLLOW UP  Future Appointments   Date Time Provider Department Center   11/9/2020  8:00 AM Love Redding M.D. CAU ENRIQUE Reyez M.D.  [address]  [facility] [address]  [phone]    In 2 weeks  As needed, If symptoms worsen, For suture removal    Love Redding M.D.  [address]  [address]  [facility] [address]  [phone]            MEDICATIONS ON DISCHARGE     Medication List      START taking these medications      Instructions   acetaminophen 500 MG Tabs  Commonly known as: TYLENOL   Take 2 Tabs by mouth every 6 hours as needed.  Dose: 1,000 mg     hydrocortisone 20 MG Tabs  Commonly known as: CORTEF   Take 1 Tab by mouth 2 times a day.  Dose: 20 mg        CONTINUE taking these medications       Instructions   atorvastatin 40 MG Tabs  Commonly known as: LIPITOR   TAKE 1 TABLET BY MOUTH ONCE DAILY AT BEDTIME     CENTRUM SILVER 50+MEN PO   Take 1 Tab by mouth every day.  Dose: 1 Tab     losartan 50 MG Tabs  Commonly known as: COZAAR   TAKE 1 TABLET BY MOUTH ONCE DAILY     metFORMIN 500 MG Tabs  Commonly known as: GLUCOPHAGE   Take 1 Tab by mouth every day.  Dose: 500 mg            Allergies  No Known Allergies    DIET  No orders of the defined types were placed in this encounter.      ACTIVITY  Ok to shower, pat incisions dry, leave open to air- no dressing   Do not soak abdominal incision is water. ie pool, bath, hot tub etc  No Aspirin or NSAIDS (Aleve, Motrin, Advil) until cleared by Dr. Reyez  No driving while on narcotic medication  Over the counter stool softeners daily while on narcotics  Continue Hydrocortisone medication until discontinued by Endocrinology  Take antibiotics until prescription completed.  Over the counter saline spray ( Ocean or similar) 2-3 times per day.   No nose blowing, sneeze with mouth open  Avoid strenuous activity or heavy lifting for 4-6 weeks      CONSULTATIONS  None    PROCEDURES  1.  Endoscopic transsphenoidal resection pituitary adenoma  2.  Stealth intraoperative navigation    LABORATORY  Lab Results   Component Value Date    SODIUM 142 08/14/2020    POTASSIUM 4.0 08/14/2020    CHLORIDE 109 08/14/2020    CO2 22 08/14/2020    GLUCOSE 118 (H) 08/14/2020    BUN 16 08/14/2020    CREATININE 0.94 08/14/2020        Lab Results   Component Value Date    WBC 18.9 (H) 08/14/2020    HEMOGLOBIN 13.4 (L) 08/14/2020    HEMATOCRIT 41.4 (L) 08/14/2020    PLATELETCT 268 08/14/2020        Total time of the discharge process exceeds 30 minutes.   Statement Selected

## 2020-10-29 ENCOUNTER — IMMUNIZATION (OUTPATIENT)
Dept: SOCIAL WORK | Facility: CLINIC | Age: 55
End: 2020-10-29

## 2020-10-29 DIAGNOSIS — Z23 NEED FOR VACCINATION: Primary | ICD-10-CM

## 2020-10-29 PROCEDURE — 90686 IIV4 VACC NO PRSV 0.5 ML IM: CPT | Performed by: REGISTERED NURSE

## 2020-11-09 ENCOUNTER — OFFICE VISIT (OUTPATIENT)
Dept: MEDICAL GROUP | Facility: MEDICAL CENTER | Age: 55
End: 2020-11-09
Payer: COMMERCIAL

## 2020-11-09 VITALS
RESPIRATION RATE: 16 BRPM | WEIGHT: 202.82 LBS | DIASTOLIC BLOOD PRESSURE: 90 MMHG | HEIGHT: 70 IN | BODY MASS INDEX: 29.04 KG/M2 | SYSTOLIC BLOOD PRESSURE: 122 MMHG | TEMPERATURE: 96.9 F | HEART RATE: 80 BPM | OXYGEN SATURATION: 95 %

## 2020-11-09 DIAGNOSIS — E11.9 CONTROLLED TYPE 2 DIABETES MELLITUS WITHOUT COMPLICATION, WITHOUT LONG-TERM CURRENT USE OF INSULIN (HCC): ICD-10-CM

## 2020-11-09 DIAGNOSIS — I10 ESSENTIAL HYPERTENSION: ICD-10-CM

## 2020-11-09 DIAGNOSIS — D35.2 PITUITARY ADENOMA (HCC): ICD-10-CM

## 2020-11-09 DIAGNOSIS — F33.0 MILD EPISODE OF RECURRENT MAJOR DEPRESSIVE DISORDER (HCC): ICD-10-CM

## 2020-11-09 LAB
HBA1C MFR BLD: 5.7 % (ref 0–5.6)
INT CON NEG: NEGATIVE
INT CON POS: POSITIVE

## 2020-11-09 PROCEDURE — 99214 OFFICE O/P EST MOD 30 MIN: CPT | Performed by: FAMILY MEDICINE

## 2020-11-09 PROCEDURE — 83036 HEMOGLOBIN GLYCOSYLATED A1C: CPT | Performed by: FAMILY MEDICINE

## 2020-11-09 ASSESSMENT — PATIENT HEALTH QUESTIONNAIRE - PHQ9
SUM OF ALL RESPONSES TO PHQ9 QUESTIONS 1 AND 2: 2
8. MOVING OR SPEAKING SO SLOWLY THAT OTHER PEOPLE COULD HAVE NOTICED. OR THE OPPOSITE, BEING SO FIGETY OR RESTLESS THAT YOU HAVE BEEN MOVING AROUND A LOT MORE THAN USUAL: NOT AT ALL
3. TROUBLE FALLING OR STAYING ASLEEP OR SLEEPING TOO MUCH: SEVERAL DAYS
1. LITTLE INTEREST OR PLEASURE IN DOING THINGS: SEVERAL DAYS
SUM OF ALL RESPONSES TO PHQ QUESTIONS 1-9: 4
4. FEELING TIRED OR HAVING LITTLE ENERGY: SEVERAL DAYS
2. FEELING DOWN, DEPRESSED, IRRITABLE, OR HOPELESS: SEVERAL DAYS
6. FEELING BAD ABOUT YOURSELF - OR THAT YOU ARE A FAILURE OR HAVE LET YOURSELF OR YOUR FAMILY DOWN: NOT AL ALL
9. THOUGHTS THAT YOU WOULD BE BETTER OFF DEAD, OR OF HURTING YOURSELF: NOT AT ALL
5. POOR APPETITE OR OVEREATING: NOT AT ALL
7. TROUBLE CONCENTRATING ON THINGS, SUCH AS READING THE NEWSPAPER OR WATCHING TELEVISION: NOT AT ALL

## 2020-11-09 ASSESSMENT — FIBROSIS 4 INDEX: FIB4 SCORE: 0.7

## 2020-11-09 NOTE — ASSESSMENT & PLAN NOTE
Chronic problem. A1C well controlled at 5.7%.   Currently on metformin, lisinopril and statin  
Chronic problem. BP moderately well controlled.   
Chronic problem. Patient reports mood is stable. Still down, but therapy was too expensive. Still uninterested in starting a medication. Does not report an SI. Feels it likely situational and should improve as the stresses of the world improves.   
S/p resectrion.   
No

## 2020-11-09 NOTE — PROGRESS NOTES
Subjective:     CC: Diagnoses of Controlled type 2 diabetes mellitus without complication, without long-term current use of insulin (HCC), Essential hypertension, Mild episode of recurrent major depressive disorder (HCC), and Pituitary adenoma (HCC) were pertinent to this visit.    HPI: Patient is a 55 y.o. male established patient who presents today for 6 month follow up.       Controlled type 2 diabetes mellitus without complication, without long-term current use of insulin (HCC)  Chronic problem. A1C well controlled at 5.7%.   Currently on metformin, lisinopril and statin    Essential hypertension  Chronic problem. BP moderately well controlled.     Mild episode of recurrent major depressive disorder (HCC)  Chronic problem. Patient reports mood is stable. Still down, but therapy was too expensive. Still uninterested in starting a medication. Does not report an SI. Feels it likely situational and should improve as the stresses of the world improves.     Pituitary adenoma (HCC)  S/p resectrion. Doing quite well after surgery.       Past Medical History:   Diagnosis Date   • Allergy    • Cancer (HCC) 2019    skin   • Diabetes (HCC)     oral meds   • High cholesterol    • Hypertension    • Psychiatric problem     depression       Social History     Tobacco Use   • Smoking status: Never Smoker   • Smokeless tobacco: Never Used   Substance Use Topics   • Alcohol use: Not Currently     Comment: 2/week   • Drug use: Never       Current Outpatient Medications Ordered in Epic   Medication Sig Dispense Refill   • metFORMIN (GLUCOPHAGE) 500 MG Tab Take 1 Tab by mouth every day. 90 Tab 1   • Multiple Vitamins-Minerals (CENTRUM SILVER 50+MEN PO) Take 1 Tab by mouth every day.     • atorvastatin (LIPITOR) 40 MG Tab TAKE 1 TABLET BY MOUTH ONCE DAILY AT BEDTIME 90 Tab 3   • losartan (COZAAR) 50 MG Tab TAKE 1 TABLET BY MOUTH ONCE DAILY 90 Tab 3     No current Epic-ordered facility-administered medications on file.   "      Allergies:  Patient has no known allergies.    Health Maintenance: Completed    ROS:  Pulm: no sob, no cough  CV: no chest pain, no palpitations      Objective:       Exam:  /90 (BP Location: Left arm, Patient Position: Sitting, BP Cuff Size: Adult)   Pulse 80   Temp 36.1 °C (96.9 °F) (Temporal)   Resp 16   Ht 1.778 m (5' 10\")   Wt 92 kg (202 lb 13.2 oz)   SpO2 95%   BMI 29.10 kg/m²  Body mass index is 29.1 kg/m².    General: Normal appearing. No distress.  HEAD: NCAT  EYES: conjunctiva clear, lids without ptosis, pupils equal and reactive to light  EARS: ears normal shape and contour.  MOUTH: normal dentition   Neck:  Normal ROM  Pulmonary: Normal effort. Normal respiratory rate.  Cardiovascular: Well perfused. No LE edema  Neurologic: Grossly normal, no focal deficits  Skin: Warm and dry.  No obvious lesions.  Musculoskeletal: Normal gait and station.   Psych: Normal mood and affect. Alert and oriented x3. Judgment and insight is normal.     Labs: 8/14/20 Results reviewed and discussed with the patient, questions answered.    Assessment & Plan:     55 y.o. male with the following -     1. Controlled type 2 diabetes mellitus without complication, without long-term current use of insulin (HCC)  Chronic problem. Very well controlled on metformin, A1c 5.7%.   - metFORMIN (GLUCOPHAGE) 500 MG Tab; Take 1 Tab by mouth every day.  Dispense: 90 Tab; Refill: 1  - Comp Metabolic Panel; Future  - Lipid Profile; Future  - CBC WITH DIFFERENTIAL; Future  - POCT Hemoglobin A1C    2. Essential hypertension  Chronic problem. Moderately well controlled on lisinopril.   - Comp Metabolic Panel; Future  - Lipid Profile; Future    3. Mild episode of recurrent major depressive disorder (HCC)  Chronic problem. Patient declines therapy or meds at this point. Reports depression is somewhat mild. Would like to try to wait it out.     4. Pituitary adenoma (HCC)  Chronic problem, s/p resection, doing quite well. Gets repeat " MRI in Jan.       Return in about 6 months (around 5/9/2021).    Please note that this dictation was created using voice recognition software. I have made every reasonable attempt to correct obvious errors, but I expect that there are errors of grammar and possibly content that I did not discover before finalizing the note.

## 2020-11-10 LAB
ALBUMIN SERPL-MCNC: 4.6 G/DL (ref 3.8–4.9)
ALBUMIN/GLOB SERPL: 1.9 {RATIO} (ref 1.2–2.2)
ALP SERPL-CCNC: 99 IU/L (ref 39–117)
ALT SERPL-CCNC: 37 IU/L (ref 0–44)
AST SERPL-CCNC: 23 IU/L (ref 0–40)
BASOPHILS # BLD AUTO: 0.1 X10E3/UL (ref 0–0.2)
BASOPHILS NFR BLD AUTO: 1 %
BILIRUB SERPL-MCNC: 0.4 MG/DL (ref 0–1.2)
BUN SERPL-MCNC: 16 MG/DL (ref 6–24)
BUN/CREAT SERPL: 13 (ref 9–20)
CALCIUM SERPL-MCNC: 9.6 MG/DL (ref 8.7–10.2)
CHLORIDE SERPL-SCNC: 109 MMOL/L (ref 96–106)
CHOLEST SERPL-MCNC: 131 MG/DL (ref 100–199)
CO2 SERPL-SCNC: 19 MMOL/L (ref 20–29)
CREAT SERPL-MCNC: 1.2 MG/DL (ref 0.76–1.27)
EOSINOPHIL # BLD AUTO: 0.1 X10E3/UL (ref 0–0.4)
EOSINOPHIL NFR BLD AUTO: 1 %
ERYTHROCYTE [DISTWIDTH] IN BLOOD BY AUTOMATED COUNT: 11.7 % (ref 11.6–15.4)
GLOBULIN SER CALC-MCNC: 2.4 G/DL (ref 1.5–4.5)
GLUCOSE SERPL-MCNC: 105 MG/DL (ref 65–99)
HCT VFR BLD AUTO: 46.8 % (ref 37.5–51)
HDLC SERPL-MCNC: 44 MG/DL
HGB BLD-MCNC: 15.9 G/DL (ref 13–17.7)
IMM GRANULOCYTES # BLD AUTO: 0 X10E3/UL (ref 0–0.1)
IMM GRANULOCYTES NFR BLD AUTO: 0 %
IMMATURE CELLS  115398: ABNORMAL
LABORATORY COMMENT REPORT: NORMAL
LDLC SERPL CALC-MCNC: 65 MG/DL (ref 0–99)
LYMPHOCYTES # BLD AUTO: 4.2 X10E3/UL (ref 0.7–3.1)
LYMPHOCYTES NFR BLD AUTO: 34 %
MCH RBC QN AUTO: 30 PG (ref 26.6–33)
MCHC RBC AUTO-ENTMCNC: 34 G/DL (ref 31.5–35.7)
MCV RBC AUTO: 88 FL (ref 79–97)
MONOCYTES # BLD AUTO: 0.8 X10E3/UL (ref 0.1–0.9)
MONOCYTES NFR BLD AUTO: 7 %
MORPHOLOGY BLD-IMP: ABNORMAL
NEUTROPHILS # BLD AUTO: 7 X10E3/UL (ref 1.4–7)
NEUTROPHILS NFR BLD AUTO: 57 %
NRBC BLD AUTO-RTO: ABNORMAL %
PLATELET # BLD AUTO: 340 X10E3/UL (ref 150–450)
POTASSIUM SERPL-SCNC: 4.4 MMOL/L (ref 3.5–5.2)
PROT SERPL-MCNC: 7 G/DL (ref 6–8.5)
RBC # BLD AUTO: 5.3 X10E6/UL (ref 4.14–5.8)
SODIUM SERPL-SCNC: 142 MMOL/L (ref 134–144)
TRIGL SERPL-MCNC: 121 MG/DL (ref 0–149)
VLDLC SERPL CALC-MCNC: 22 MG/DL (ref 5–40)
WBC # BLD AUTO: 12.2 X10E3/UL (ref 3.4–10.8)

## 2020-11-13 ENCOUNTER — TELEMEDICINE (OUTPATIENT)
Dept: MEDICAL GROUP | Facility: MEDICAL CENTER | Age: 55
End: 2020-11-13
Payer: COMMERCIAL

## 2020-11-13 VITALS — BODY MASS INDEX: 28.06 KG/M2 | WEIGHT: 196 LBS | HEIGHT: 70 IN | TEMPERATURE: 98.6 F

## 2020-11-13 DIAGNOSIS — D72.829 LEUKOCYTOSIS, UNSPECIFIED TYPE: ICD-10-CM

## 2020-11-13 PROCEDURE — 99214 OFFICE O/P EST MOD 30 MIN: CPT | Mod: 95,CR | Performed by: FAMILY MEDICINE

## 2020-11-13 RX ORDER — AMOXICILLIN AND CLAVULANATE POTASSIUM 875; 125 MG/1; MG/1
1 TABLET, FILM COATED ORAL 2 TIMES DAILY
Qty: 14 TAB | Refills: 0 | Status: SHIPPED | OUTPATIENT
Start: 2020-11-13 | End: 2020-11-20

## 2020-11-13 ASSESSMENT — FIBROSIS 4 INDEX: FIB4 SCORE: 0.59

## 2020-11-14 NOTE — PROGRESS NOTES
Virtual Visit: Established Patient   This visit was conducted via Zoom using secure and encrypted videoconferencing technology. The patient was in a private location in the state of [place].    The patient's identity was confirmed and verbal consent was obtained for this virtual visit.    Subjective:   CC:   Chief Complaint   Patient presents with   • Lab Results       Victor Hugo Sherwood is a 55 y.o. male presenting for evaluation and management of:    Leukocytosis  New problem.   Mild, 12.2, diff shows slightly Increased lymph  Has been having sinus congestion and sour smell. Otherwise asymptomatic.   No pain.   No discharge  No headache or pressure  Had surgery in 8/12/20 for pituitary adenoma.   Notes the sinus congestion and sour smell started about 1 month ago.   Sees surgeon on Monday.     ROS   Denies any recent fevers or chills. No nausea or vomiting. No chest pains or shortness of breath.     No Known Allergies    Current medicines (including changes today)  Current Outpatient Medications   Medication Sig Dispense Refill   • amoxicillin-clavulanate (AUGMENTIN) 875-125 MG Tab Take 1 Tab by mouth 2 times a day for 7 days. 14 Tab 0   • metFORMIN (GLUCOPHAGE) 500 MG Tab Take 1 Tab by mouth every day. 90 Tab 1   • Multiple Vitamins-Minerals (CENTRUM SILVER 50+MEN PO) Take 1 Tab by mouth every day.     • atorvastatin (LIPITOR) 40 MG Tab TAKE 1 TABLET BY MOUTH ONCE DAILY AT BEDTIME 90 Tab 3   • losartan (COZAAR) 50 MG Tab TAKE 1 TABLET BY MOUTH ONCE DAILY 90 Tab 3     No current facility-administered medications for this visit.        Patient Active Problem List    Diagnosis Date Noted   • Leukocytosis 11/13/2020   • Pituitary adenoma (HCC) 06/11/2020   • Overweight 12/10/2019   • Controlled type 2 diabetes mellitus without complication, without long-term current use of insulin (HCC) 11/11/2019   • Essential hypertension 11/11/2019   • Pure hypercholesterolemia 11/11/2019   • Mild episode of recurrent major  "depressive disorder (HCC) 11/11/2019       Family History   Problem Relation Age of Onset   • Migraines Mother    • Cancer Father    • Hypertension Sister    • No Known Problems Brother        He  has a past medical history of Allergy, Cancer (HCC) (2019), Diabetes (HCC), High cholesterol, Hypertension, and Psychiatric problem.  He  has a past surgical history that includes other orthopedic surgery; other; pr repair of nasal septum (8/12/2020); transsphenoidal resection (8/12/2020); ethmoidectomy (Bilateral, 8/12/2020); sphenoidectomy (8/12/2020); antrostomy (8/12/2020); and nasal endoscopy (8/12/2020).       Objective:   Temp 37 °C (98.6 °F) (Temporal)   Ht 1.778 m (5' 10\")   Wt 88.9 kg (196 lb)   BMI 28.12 kg/m²     Physical Exam:  General: Normal appearing. No distress.  HEAD: NCAT  EYES: conjunctiva clear, lids without ptosis, pupils equal and reactive to light  EARS: ears normal shape and contour.  MOUTH: normal dentition   Neck:  Normal ROM  Pulmonary: Normal effort. Normal respiratory rate.  Cardiovascular: Well perfused.   Neurologic: Grossly normal, no focal deficits  Skin: Warm and dry.  No obvious lesions.  Musculoskeletal: Normal gait and station.   Psych: Normal mood and affect. Alert and oriented x3. Judgment and insight is normal.    Assessment and Plan:   The following treatment plan was discussed:     1. Leukocytosis, unspecified type  New problem. Etiology unclear. Runs on the high side, baseline is 9.5. Patient had elevated WBC on surgery day and 2 days post op which is to be expected.   Given sinus congestion and foul smell, plan to treat with augmentin.   Plan to repeat CBC in 1 month.   Patient to discuss with his surgeon on Monday.   - amoxicillin-clavulanate (AUGMENTIN) 875-125 MG Tab; Take 1 Tab by mouth 2 times a day for 7 days.  Dispense: 14 Tab; Refill: 0  - CBC WITH DIFFERENTIAL; Future        Follow-up: Return in about 4 weeks (around 12/11/2020).         "

## 2020-11-14 NOTE — ASSESSMENT & PLAN NOTE
New problem.   Mild  12.2  Increased lymph  Has been having sinus congestion and sour smell.   No pain.   No discharge  No headache or pressure  Had surgery in 8/12/20  This started about 1 month ago.   Sees surgeon on Monday.

## 2020-12-15 ENCOUNTER — HOSPITAL ENCOUNTER (OUTPATIENT)
Dept: LAB | Facility: MEDICAL CENTER | Age: 55
End: 2020-12-15
Payer: COMMERCIAL

## 2020-12-16 LAB
COVID ORDER STATUS COVID19: NORMAL
SARS-COV-2 RNA RESP QL NAA+PROBE: NOTDETECTED
SPECIMEN SOURCE: NORMAL

## 2020-12-17 LAB
BASOPHILS # BLD AUTO: 0.1 X10E3/UL (ref 0–0.2)
BASOPHILS NFR BLD AUTO: 1 %
EOSINOPHIL # BLD AUTO: 0.1 X10E3/UL (ref 0–0.4)
EOSINOPHIL NFR BLD AUTO: 1 %
ERYTHROCYTE [DISTWIDTH] IN BLOOD BY AUTOMATED COUNT: 11.6 % (ref 11.6–15.4)
HCT VFR BLD AUTO: 44.2 % (ref 37.5–51)
HGB BLD-MCNC: 14.9 G/DL (ref 13–17.7)
IMM GRANULOCYTES # BLD AUTO: 0 X10E3/UL (ref 0–0.1)
IMM GRANULOCYTES NFR BLD AUTO: 0 %
IMMATURE CELLS  115398: ABNORMAL
LYMPHOCYTES # BLD AUTO: 4.2 X10E3/UL (ref 0.7–3.1)
LYMPHOCYTES NFR BLD AUTO: 40 %
MCH RBC QN AUTO: 29.5 PG (ref 26.6–33)
MCHC RBC AUTO-ENTMCNC: 33.7 G/DL (ref 31.5–35.7)
MCV RBC AUTO: 88 FL (ref 79–97)
MONOCYTES # BLD AUTO: 0.8 X10E3/UL (ref 0.1–0.9)
MONOCYTES NFR BLD AUTO: 8 %
MORPHOLOGY BLD-IMP: ABNORMAL
NEUTROPHILS # BLD AUTO: 5.2 X10E3/UL (ref 1.4–7)
NEUTROPHILS NFR BLD AUTO: 50 %
NRBC BLD AUTO-RTO: ABNORMAL %
PLATELET # BLD AUTO: 313 X10E3/UL (ref 150–450)
RBC # BLD AUTO: 5.05 X10E6/UL (ref 4.14–5.8)
WBC # BLD AUTO: 10.4 X10E3/UL (ref 3.4–10.8)

## 2021-02-23 ENCOUNTER — HOSPITAL ENCOUNTER (OUTPATIENT)
Dept: RADIOLOGY | Facility: MEDICAL CENTER | Age: 56
End: 2021-02-23
Attending: NURSE PRACTITIONER
Payer: COMMERCIAL

## 2021-02-23 DIAGNOSIS — D35.3 BENIGN NEOPLASM OF PITUITARY GLAND AND CRANIOPHARYNGEAL DUCT (POUCH) (HCC): ICD-10-CM

## 2021-02-23 DIAGNOSIS — D35.2 BENIGN NEOPLASM OF PITUITARY GLAND AND CRANIOPHARYNGEAL DUCT (POUCH) (HCC): ICD-10-CM

## 2021-02-23 PROCEDURE — A9576 INJ PROHANCE MULTIPACK: HCPCS | Performed by: NURSE PRACTITIONER

## 2021-02-23 PROCEDURE — 70553 MRI BRAIN STEM W/O & W/DYE: CPT

## 2021-02-23 PROCEDURE — 700117 HCHG RX CONTRAST REV CODE 255: Performed by: NURSE PRACTITIONER

## 2021-02-23 RX ADMIN — GADOTERIDOL 20 ML: 279.3 INJECTION, SOLUTION INTRAVENOUS at 16:02

## 2021-04-30 RX ORDER — LOSARTAN POTASSIUM 50 MG/1
TABLET ORAL
Qty: 90 TABLET | Refills: 3 | Status: SHIPPED | OUTPATIENT
Start: 2021-04-30 | End: 2021-11-02 | Stop reason: SDUPTHER

## 2021-04-30 NOTE — TELEPHONE ENCOUNTER
Received request via: Pharmacy    Was the patient seen in the last year in this department? Yes    Does the patient have an active prescription (recently filled or refills available) for medication(s) requested? No     Future Appointments       Provider Department Center    5/10/2021 7:30 AM Love Redding M.D. [facility]

## 2021-05-03 DIAGNOSIS — E11.9 CONTROLLED TYPE 2 DIABETES MELLITUS WITHOUT COMPLICATION, WITHOUT LONG-TERM CURRENT USE OF INSULIN (HCC): ICD-10-CM

## 2021-05-03 DIAGNOSIS — E66.3 OVERWEIGHT: ICD-10-CM

## 2021-05-06 LAB
ALBUMIN SERPL-MCNC: 4.3 G/DL (ref 3.8–4.9)
ALBUMIN/CREAT UR: 4 MG/G CREAT (ref 0–29)
ALBUMIN/GLOB SERPL: 1.8 {RATIO} (ref 1.2–2.2)
ALP SERPL-CCNC: 98 IU/L (ref 39–117)
ALT SERPL-CCNC: 19 IU/L (ref 0–44)
AST SERPL-CCNC: 17 IU/L (ref 0–40)
BASOPHILS # BLD AUTO: 0.1 X10E3/UL (ref 0–0.2)
BASOPHILS NFR BLD AUTO: 1 %
BILIRUB SERPL-MCNC: 0.5 MG/DL (ref 0–1.2)
BUN SERPL-MCNC: 16 MG/DL (ref 6–24)
BUN/CREAT SERPL: 16 (ref 9–20)
CALCIUM SERPL-MCNC: 9.5 MG/DL (ref 8.7–10.2)
CHLORIDE SERPL-SCNC: 108 MMOL/L (ref 96–106)
CO2 SERPL-SCNC: 20 MMOL/L (ref 20–29)
CREAT SERPL-MCNC: 1.01 MG/DL (ref 0.76–1.27)
CREAT UR-MCNC: 120 MG/DL
EOSINOPHIL # BLD AUTO: 0.1 X10E3/UL (ref 0–0.4)
EOSINOPHIL NFR BLD AUTO: 1 %
ERYTHROCYTE [DISTWIDTH] IN BLOOD BY AUTOMATED COUNT: 11.7 % (ref 11.6–15.4)
GLOBULIN SER CALC-MCNC: 2.4 G/DL (ref 1.5–4.5)
GLUCOSE SERPL-MCNC: 120 MG/DL (ref 65–99)
HBA1C MFR BLD: 6.3 % (ref 4.8–5.6)
HCT VFR BLD AUTO: 45.1 % (ref 37.5–51)
HGB BLD-MCNC: 15.5 G/DL (ref 13–17.7)
IMM GRANULOCYTES # BLD AUTO: 0.1 X10E3/UL (ref 0–0.1)
IMM GRANULOCYTES NFR BLD AUTO: 1 %
IMMATURE CELLS  115398: NORMAL
LYMPHOCYTES # BLD AUTO: 3.1 X10E3/UL (ref 0.7–3.1)
LYMPHOCYTES NFR BLD AUTO: 29 %
MCH RBC QN AUTO: 30.5 PG (ref 26.6–33)
MCHC RBC AUTO-ENTMCNC: 34.4 G/DL (ref 31.5–35.7)
MCV RBC AUTO: 89 FL (ref 79–97)
MICROALBUMIN UR-MCNC: 4.6 UG/ML
MONOCYTES # BLD AUTO: 0.7 X10E3/UL (ref 0.1–0.9)
MONOCYTES NFR BLD AUTO: 7 %
MORPHOLOGY BLD-IMP: NORMAL
NEUTROPHILS # BLD AUTO: 6.6 X10E3/UL (ref 1.4–7)
NEUTROPHILS NFR BLD AUTO: 61 %
NRBC BLD AUTO-RTO: NORMAL %
PLATELET # BLD AUTO: 347 X10E3/UL (ref 150–450)
POTASSIUM SERPL-SCNC: 4.3 MMOL/L (ref 3.5–5.2)
PROT SERPL-MCNC: 6.7 G/DL (ref 6–8.5)
RBC # BLD AUTO: 5.08 X10E6/UL (ref 4.14–5.8)
SODIUM SERPL-SCNC: 142 MMOL/L (ref 134–144)
WBC # BLD AUTO: 10.7 X10E3/UL (ref 3.4–10.8)

## 2021-05-10 ENCOUNTER — OFFICE VISIT (OUTPATIENT)
Dept: MEDICAL GROUP | Facility: MEDICAL CENTER | Age: 56
End: 2021-05-10
Payer: COMMERCIAL

## 2021-05-10 VITALS
OXYGEN SATURATION: 97 % | BODY MASS INDEX: 28.2 KG/M2 | WEIGHT: 197 LBS | SYSTOLIC BLOOD PRESSURE: 126 MMHG | HEART RATE: 76 BPM | HEIGHT: 70 IN | DIASTOLIC BLOOD PRESSURE: 80 MMHG | TEMPERATURE: 97.7 F

## 2021-05-10 DIAGNOSIS — D35.2 PITUITARY ADENOMA (HCC): ICD-10-CM

## 2021-05-10 DIAGNOSIS — E78.00 PURE HYPERCHOLESTEROLEMIA: ICD-10-CM

## 2021-05-10 DIAGNOSIS — I10 ESSENTIAL HYPERTENSION: ICD-10-CM

## 2021-05-10 DIAGNOSIS — E11.9 CONTROLLED TYPE 2 DIABETES MELLITUS WITHOUT COMPLICATION, WITHOUT LONG-TERM CURRENT USE OF INSULIN (HCC): ICD-10-CM

## 2021-05-10 PROBLEM — D72.829 LEUKOCYTOSIS: Status: RESOLVED | Noted: 2020-11-13 | Resolved: 2021-05-10

## 2021-05-10 PROCEDURE — 99214 OFFICE O/P EST MOD 30 MIN: CPT | Performed by: FAMILY MEDICINE

## 2021-05-10 ASSESSMENT — FIBROSIS 4 INDEX: FIB4 SCORE: 0.6

## 2021-05-10 NOTE — PROGRESS NOTES
Subjective:     CC: Diagnoses of Essential hypertension, Controlled type 2 diabetes mellitus without complication, without long-term current use of insulin (HCC), Pure hypercholesterolemia, and Pituitary adenoma (HCC) were pertinent to this visit.    HPI: Patient is a 55 y.o. male established patient who presents today for general 6-month follow-up.      Essential hypertension  Chronic problem. Well controlled. /80. Currently on losartan 50mg.     Controlled type 2 diabetes mellitus without complication, without long-term current use of insulin (HCC)  Chronic problem. Well controlled. A1C 6.3%. Currently on metformin 500mg daily.    Pure hypercholesterolemia  Chronic problem. Well controlled on atorvastatin 40mg.     Pituitary adenoma (HCC)  S/p resection Aug 2020. Seen by Dr. Reyez in March of 2021 last.  Repeat MRI looked good. Small amount of adenoma left, too close to carotid to remove. Plan to monitor with MRIs moving forward. Patient is asymptomataic.       Past Medical History:   Diagnosis Date   • Allergy    • Cancer (HCC) 2019    skin   • Diabetes (HCC)     oral meds   • High cholesterol    • Hypertension    • Psychiatric problem     depression       Social History     Tobacco Use   • Smoking status: Never Smoker   • Smokeless tobacco: Never Used   Substance Use Topics   • Alcohol use: Not Currently     Comment: 2/week   • Drug use: Never       Current Outpatient Medications Ordered in Epic   Medication Sig Dispense Refill   • losartan (COZAAR) 50 MG Tab TAKE 1 TABLET BY MOUTH EVERY DAY 90 tablet 3   • metFORMIN (GLUCOPHAGE) 500 MG Tab Take 1 Tab by mouth every day. 90 Tab 1   • atorvastatin (LIPITOR) 40 MG Tab TAKE 1 TABLET BY MOUTH ONCE DAILY AT BEDTIME 90 Tab 3     No current Epic-ordered facility-administered medications on file.       Allergies:  Patient has no known allergies.    Health Maintenance: Completed    ROS:    Pulm: no sob, no cough  CV: no chest pain, no palpitations      Objective:  "      Exam:  /80 (BP Location: Left arm, Patient Position: Sitting, BP Cuff Size: Adult long)   Pulse 76   Temp 36.5 °C (97.7 °F) (Temporal)   Ht 1.778 m (5' 10\")   Wt 89.4 kg (197 lb)   SpO2 97%   BMI 28.27 kg/m²  Body mass index is 28.27 kg/m².      General: Normal appearing. No distress.  HEAD: NCAT  EYES: conjunctiva clear, lids without ptosis, pupils equal  and reactive to light  EARS: ears normal shape and contour, canals are clear bilaterally, TMs clear  MOUTH: Mask in place throughout exam.   Neck: Supple without masses. Thyroid is not enlarged. Normal ROM  Pulmonary: Clear to ausculation.  Normal effort. No rales, ronchi, or wheezing.  Cardiovascular: Regular rate and rhythm, no murmur. No LE edema  Neurologic: Grossly normal, no focal deficits  Lymph: No cervical or supraclavicular lymph nodes are palpable  Skin: Warm and dry.  No obvious lesions.  Musculoskeletal: Normal gait and station.   Psych: Normal mood and affect. Alert and oriented x3. Judgment and insight is normal.    Labs: 5/5/21 Results reviewed and discussed with the patient, questions answered.    Assessment & Plan:     55 y.o. male with the following -     1. Essential hypertension  Chronic problem, well-controlled on losartan 50 mg.  Reviewed labs today.  Kidney function looks good.  - Lipid Profile; Future    2. Controlled type 2 diabetes mellitus without complication, without long-term current use of insulin (HCC)  On problem, well-controlled on Metformin 500 mg.  A1c 6.3%.  - Comp Metabolic Panel; Future  - Lipid Profile; Future  - HEMOGLOBIN A1C; Future  - MICROALBUMIN CREAT RATIO URINE; Future    3. Pure hypercholesterolemia  Chronic problem, well-controlled on atorvastatin 40 mg.  Repeat lipid panel ordered for November 2021.  - Lipid Profile; Future    4. Pituitary adenoma (HCC)  Status post resection in August 2020.  Followed by Dr. Reyez.  Recently had MRI, there was significant reduction in size of the adenoma.  " Plan to continue to monitor with every 6 month MRIs for now.      Return in about 6 months (around 11/10/2021).    Please note that this dictation was created using voice recognition software. I have made every reasonable attempt to correct obvious errors, but I expect that there are errors of grammar and possibly content that I did not discover before finalizing the note.       normal...

## 2021-05-10 NOTE — ASSESSMENT & PLAN NOTE
S/p resection Aug 2020. Seen by Dr. Reyez in March of 2021 last. MRI looked good. Small amount of adenoma left, too close to carotid to remove. Plan to monitor with MRIs moving forward. Patient is asymptomataic.

## 2021-05-12 DIAGNOSIS — E11.9 CONTROLLED TYPE 2 DIABETES MELLITUS WITHOUT COMPLICATION, WITHOUT LONG-TERM CURRENT USE OF INSULIN (HCC): ICD-10-CM

## 2021-06-11 RX ORDER — ATORVASTATIN CALCIUM 40 MG/1
TABLET, FILM COATED ORAL
Qty: 90 TABLET | Refills: 3 | Status: SHIPPED | OUTPATIENT
Start: 2021-06-11 | End: 2021-11-02 | Stop reason: SDUPTHER

## 2021-10-16 DIAGNOSIS — E11.9 CONTROLLED TYPE 2 DIABETES MELLITUS WITHOUT COMPLICATION, WITHOUT LONG-TERM CURRENT USE OF INSULIN (HCC): ICD-10-CM

## 2021-11-02 ENCOUNTER — OFFICE VISIT (OUTPATIENT)
Dept: MEDICAL GROUP | Facility: MEDICAL CENTER | Age: 56
End: 2021-11-02
Payer: COMMERCIAL

## 2021-11-02 VITALS
DIASTOLIC BLOOD PRESSURE: 86 MMHG | TEMPERATURE: 97.4 F | SYSTOLIC BLOOD PRESSURE: 124 MMHG | WEIGHT: 197 LBS | HEIGHT: 70 IN | HEART RATE: 60 BPM | BODY MASS INDEX: 28.2 KG/M2 | OXYGEN SATURATION: 98 %

## 2021-11-02 DIAGNOSIS — I10 ESSENTIAL HYPERTENSION: ICD-10-CM

## 2021-11-02 DIAGNOSIS — E11.9 CONTROLLED TYPE 2 DIABETES MELLITUS WITHOUT COMPLICATION, WITHOUT LONG-TERM CURRENT USE OF INSULIN (HCC): ICD-10-CM

## 2021-11-02 DIAGNOSIS — D35.2 PITUITARY ADENOMA (HCC): ICD-10-CM

## 2021-11-02 DIAGNOSIS — E78.00 PURE HYPERCHOLESTEROLEMIA: ICD-10-CM

## 2021-11-02 LAB
HBA1C MFR BLD: 5.8 % (ref 0–5.6)
INT CON NEG: NEGATIVE
INT CON POS: POSITIVE

## 2021-11-02 PROCEDURE — 83036 HEMOGLOBIN GLYCOSYLATED A1C: CPT | Performed by: FAMILY MEDICINE

## 2021-11-02 PROCEDURE — 99214 OFFICE O/P EST MOD 30 MIN: CPT | Performed by: FAMILY MEDICINE

## 2021-11-02 RX ORDER — ALPRAZOLAM 0.5 MG/1
TABLET ORAL
COMMUNITY
Start: 2021-08-30 | End: 2021-11-02

## 2021-11-02 RX ORDER — LOSARTAN POTASSIUM 50 MG/1
50 TABLET ORAL
Qty: 90 TABLET | Refills: 3 | Status: SHIPPED | OUTPATIENT
Start: 2021-11-02 | End: 2022-11-10

## 2021-11-02 RX ORDER — ATORVASTATIN CALCIUM 40 MG/1
40 TABLET, FILM COATED ORAL DAILY
Qty: 90 TABLET | Refills: 3 | Status: SHIPPED | OUTPATIENT
Start: 2021-11-02 | End: 2022-05-17 | Stop reason: SDUPTHER

## 2021-11-02 RX ORDER — POLYETHYLENE GLYCOL 3350, SODIUM CHLORIDE, SODIUM BICARBONATE, POTASSIUM CHLORIDE 420; 11.2; 5.72; 1.48 G/4L; G/4L; G/4L; G/4L
POWDER, FOR SOLUTION ORAL
COMMUNITY
Start: 2021-10-13 | End: 2021-11-02

## 2021-11-02 ASSESSMENT — FIBROSIS 4 INDEX: FIB4 SCORE: 0.61

## 2021-11-02 ASSESSMENT — PATIENT HEALTH QUESTIONNAIRE - PHQ9: CLINICAL INTERPRETATION OF PHQ2 SCORE: 0

## 2021-11-02 NOTE — ASSESSMENT & PLAN NOTE
Chronic problem. A1C down to 5.8%.  Patient takes Metformin 500 mg once daily.  No side effects.  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

## 2021-11-02 NOTE — ASSESSMENT & PLAN NOTE
MRI done 10/4/21, looked good.   Seen by neurosurgeon yesterday. Advised everything looked good. Plan to repeat MRI in a year again.

## 2021-11-02 NOTE — PROGRESS NOTES
Subjective:     CC: Diagnoses of Controlled type 2 diabetes mellitus without complication, without long-term current use of insulin (HCC), Pituitary adenoma (HCC), Essential hypertension, and Pure hypercholesterolemia were pertinent to this visit.    HPI: Patient is a 56 y.o. male established patient who presents today for general 6-month follow-up.      Pituitary adenoma (HCC)  MRI done 10/4/21, looked good.   Seen by neurosurgeon yesterday. Advised everything looked good. Plan to repeat MRI in a year again.    Essential hypertension  Chronic problem. Currenlty on losartan 50mg. Well controlled.     Controlled type 2 diabetes mellitus without complication, without long-term current use of insulin (HCC)  Chronic problem. A1C down to 5.8%.  Patient takes Metformin 500 mg once daily.  No side effects.  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     Pure hypercholesterolemia  Chronic problem. Last lipid panel November 2020.  Well-controlled.    Component      Latest Ref Rng & Units 11/9/2020           6:04 AM   Cholesterol,Tot      100 - 199 mg/dL 131   Triglycerides      0 - 149 mg/dL 121   HDL      >39 mg/dL 44   VLDL Cholesterol Calc      5 - 40 mg/dL 22   LDL Chol Calc ([facility])      0 - 99 mg/dL 65       Past Medical History:   Diagnosis Date   • Allergy    • Cancer (HCC) 2019    skin   • Diabetes (HCC)     oral meds   • High cholesterol    • Hypertension    • Psychiatric problem     depression       Social History     Tobacco Use   • Smoking status: Never Smoker   • Smokeless tobacco: Never Used   Vaping Use   • Vaping Use: Never used   Substance Use Topics   • Alcohol use: Not Currently     Comment: 2/week   • Drug use: Never       Current Outpatient Medications Ordered in Epic   Medication Sig Dispense Refill   • metFORMIN (GLUCOPHAGE) 500 MG Tab Take 1 Tablet by mouth every day. 90 Tablet 3   • losartan (COZAAR) 50 MG Tab Take 1 Tablet by mouth every day. 90 Tablet 3   •  "atorvastatin (LIPITOR) 40 MG Tab Take 1 Tablet by mouth every day. Take 1 tablet by mouth every night at bedtime 90 Tablet 3     No current Epic-ordered facility-administered medications on file.       Allergies:  Patient has no known allergies.    Health Maintenance: Completed    ROS:  Pulm: no sob, no cough  CV: no chest pain, no palpitations      Objective:       Exam:  /86 (BP Location: Right arm, Patient Position: Sitting, BP Cuff Size: Adult long)   Pulse 60   Temp 36.3 °C (97.4 °F) (Temporal)   Ht 1.778 m (5' 10\")   Wt 89.4 kg (197 lb)   SpO2 98%   BMI 28.27 kg/m²  Body mass index is 28.27 kg/m².      General: Normal appearing. No distress.  HEAD: NCAT  EYES: conjunctiva clear, lids without ptosis, pupils equal  and reactive to light  EARS: ears normal shape and contour, canals are clear bilaterall=, TMs clear  MOUTH: Mask in place throughout exam.   Neck: Supple without masses. Thyroid is not enlarged. Normal ROM  Pulmonary: Clear to ausculation.  Normal effort. No rales, ronchi, or wheezing.  Cardiovascular: Regular rate and rhythm, no murmur. No LE edema  Neurologic: Grossly normal, no focal deficits  Lymph: No cervical or supraclavicular lymph nodes are palpable  Skin: Warm and dry.  No obvious lesions.  Musculoskeletal: Normal gait and station.   Psych: Normal mood and affect. Alert and oriented x3. Judgment and insight is normal.        Labs: 5/5/2021 results reviewed and discussed with the patient, questions answered.    Assessment & Plan:     56 y.o. male with the following -     1. Controlled type 2 diabetes mellitus without complication, without long-term current use of insulin ([facility])  Chronic problem, well-controlled, A1c 5.8%.  Currently on Metformin 500 mg once daily.  Annual labs ordered.  - POCT Hemoglobin A1C  - Comp Metabolic Panel; Future  - MICROALBUMIN CREAT RATIO URINE; Future  - metFORMIN (GLUCOPHAGE) 500 MG Tab; Take 1 Tablet by mouth every day.  Dispense: 90 Tablet; Refill: " 3    2. Pituitary adenoma (HCC)  Chronic problem, followed by neurosurgery.  Had MRI done about 1 month ago, no concerning findings.  Unfortunate, they were unable to take out the entire pituitary adenoma so they must continue monitor.  He will get another MRI next year.    3. Essential hypertension  Chronic problem, well-controlled on losartan 50 mg.  - Comp Metabolic Panel; Future  - MICROALBUMIN CREAT RATIO URINE; Future  - losartan (COZAAR) 50 MG Tab; Take 1 Tablet by mouth every day.  Dispense: 90 Tablet; Refill: 3    4. Pure hypercholesterolemia  Chronic problem, well-controlled on atorvastatin 40 mg.  Refill placed today.  Annual labs ordered.  - Lipid Profile; Future  - atorvastatin (LIPITOR) 40 MG Tab; Take 1 Tablet by mouth every day. Take 1 tablet by mouth every night at bedtime  Dispense: 90 Tablet; Refill: 3    Health maintenance  Recommended pneumonia vaccine as well as shingles vaccine, he will see if these are paid for by his insurance.  He had a colonoscopy done yesterday, we will request those records.  He reports he is up-to-date with retinal screening, gets those done through [facility], we will request those records.    Return in about 6 months (around 5/2/2022).    Please note that this dictation was created using voice recognition software. I have made every reasonable attempt to correct obvious errors, but I expect that there are errors of grammar and possibly content that I did not discover before finalizing the note.

## 2021-11-02 NOTE — LETTER
[facility]  Love Redding M.D.  [address]  [place] [address]  Fax: [phone]   Authorization for Release/Disclosure of   Protected Health Information   Name: VICTOR HUGO MOSLEY : 1965 SSN: [id number]   Address: [facility] [place] [address] Phone:    [phone] (home) [phone] (work)   I authorize the entity listed below to release/disclose the PHI below to:   [facility]/Love Redding M.D. and Love Redding M.D.   Provider or Entity Name:  [facility]    Address   City, State, [facility]   Phone:      Fax:  [phone]   Reason for request: continuity of care   Information to be released:    [  ] LAST COLONOSCOPY,  including any PATH REPORT and follow-up  [  ] LAST FIT/COLOGUARD RESULT [  ] LAST DEXA  [  ] LAST MAMMOGRAM  [  ] LAST PAP  [  ] LAST LABS [XXXX ] RETINA EXAM REPORT  [  ] IMMUNIZATION RECORDS  [  ] Release all info      [  ] Check here and initial the line next to each item to release ALL health information INCLUDING  _____ Care and treatment for drug and / or alcohol abuse  _____ HIV testing, infection status, or AIDS  _____ Genetic Testing    DATES OF SERVICE OR TIME PERIOD TO BE DISCLOSED: _____________  I understand and acknowledge that:  * This Authorization may be revoked at any time by you in writing, except if your health information has already been used or disclosed.  * Your health information that will be used or disclosed as a result of you signing this authorization could be re-disclosed by the recipient. If this occurs, your re-disclosed health information may no longer be protected by State or Federal laws.  * You may refuse to sign this Authorization. Your refusal will not affect your ability to obtain treatment.  * This Authorization becomes effective upon signing and will  on (date) __________.      If no date is indicated, this Authorization will  one (1) year from the signature date.    Name: Victor Hugo Mcnlaly  Presely    Signature: Continuity of Care    Date:     11/2/2021       PLEASE FAX REQUESTED RECORDS BACK TO: [phone]

## 2021-11-02 NOTE — LETTER
[facility]  Love Redding M.D.  [address]  [place] [address]  Fax: [phone]   Authorization for Release/Disclosure of   Protected Health Information   Name: VICTOR HUGO MOSLEY : 1965 SSN: [id number]   Address: [facility] [place] [address] Phone:    [phone] (home) [phone] (work)   I authorize the entity listed below to release/disclose the PHI below to:   [facility]/Love Redding M.D. and Love Redding M.D.   Provider or Entity Name:  [facility]    Address   City, [facility]   Phone:      Fax:  [phone]   Reason for request: continuity of care   Information to be released:    [XXXX] LAST COLONOSCOPY,  including any PATH REPORT and follow-up  [  ] LAST FIT/COLOGUARD RESULT [  ] LAST DEXA  [  ] LAST MAMMOGRAM  [  ] LAST PAP  [  ] LAST LABS [  ] RETINA EXAM REPORT  [  ] IMMUNIZATION RECORDS  [  ] Release all info      [  ] Check here and initial the line next to each item to release ALL health information INCLUDING  _____ Care and treatment for drug and / or alcohol abuse  _____ HIV testing, infection status, or AIDS  _____ Genetic Testing    DATES OF SERVICE OR TIME PERIOD TO BE DISCLOSED: _____________  I understand and acknowledge that:  * This Authorization may be revoked at any time by you in writing, except if your health information has already been used or disclosed.  * Your health information that will be used or disclosed as a result of you signing this authorization could be re-disclosed by the recipient. If this occurs, your re-disclosed health information may no longer be protected by State or Federal laws.  * You may refuse to sign this Authorization. Your refusal will not affect your ability to obtain treatment.  * This Authorization becomes effective upon signing and will  on (date) __________.      If no date is indicated, this Authorization will  one (1) year from the signature date.    Name: Victor Hugo  Uli Sherwood    Signature: Continuity of Care    Date:     11/2/2021       PLEASE FAX REQUESTED RECORDS BACK TO: [phone]

## 2021-11-03 LAB
ALBUMIN SERPL-MCNC: 4.1 G/DL (ref 3.8–4.9)
ALBUMIN/CREAT UR: <11 MG/G CREAT (ref 0–29)
ALBUMIN/GLOB SERPL: 1.7 {RATIO} (ref 1.2–2.2)
ALP SERPL-CCNC: 85 IU/L (ref 44–121)
ALT SERPL-CCNC: 29 IU/L (ref 0–44)
AST SERPL-CCNC: 21 IU/L (ref 0–40)
BILIRUB SERPL-MCNC: 0.5 MG/DL (ref 0–1.2)
BUN SERPL-MCNC: 14 MG/DL (ref 6–24)
BUN/CREAT SERPL: 15 (ref 9–20)
CALCIUM SERPL-MCNC: 9.1 MG/DL (ref 8.7–10.2)
CHLORIDE SERPL-SCNC: 110 MMOL/L (ref 96–106)
CHOLEST SERPL-MCNC: 152 MG/DL (ref 100–199)
CO2 SERPL-SCNC: 22 MMOL/L (ref 20–29)
CREAT SERPL-MCNC: 0.92 MG/DL (ref 0.76–1.27)
CREAT UR-MCNC: 26.6 MG/DL
GLOBULIN SER CALC-MCNC: 2.4 G/DL (ref 1.5–4.5)
GLUCOSE SERPL-MCNC: 113 MG/DL (ref 65–99)
HDLC SERPL-MCNC: 44 MG/DL
LABORATORY COMMENT REPORT: NORMAL
LDLC SERPL CALC-MCNC: 89 MG/DL (ref 0–99)
MICROALBUMIN UR-MCNC: <3 UG/ML
POTASSIUM SERPL-SCNC: 4.1 MMOL/L (ref 3.5–5.2)
PROT SERPL-MCNC: 6.5 G/DL (ref 6–8.5)
SODIUM SERPL-SCNC: 141 MMOL/L (ref 134–144)
TRIGL SERPL-MCNC: 104 MG/DL (ref 0–149)
VLDLC SERPL CALC-MCNC: 19 MG/DL (ref 5–40)

## 2022-05-10 LAB
ALBUMIN SERPL-MCNC: 4.2 G/DL (ref 3.8–4.9)
ALBUMIN/CREAT UR: 3 MG/G CREAT (ref 0–29)
ALBUMIN/GLOB SERPL: 1.7 {RATIO} (ref 1.2–2.2)
ALP SERPL-CCNC: 93 IU/L (ref 44–121)
ALT SERPL-CCNC: 27 IU/L (ref 0–44)
AST SERPL-CCNC: 21 IU/L (ref 0–40)
BILIRUB SERPL-MCNC: 0.4 MG/DL (ref 0–1.2)
BUN SERPL-MCNC: 14 MG/DL (ref 6–24)
BUN/CREAT SERPL: 14 (ref 9–20)
CALCIUM SERPL-MCNC: 9.3 MG/DL (ref 8.7–10.2)
CHLORIDE SERPL-SCNC: 106 MMOL/L (ref 96–106)
CHOLEST SERPL-MCNC: 147 MG/DL (ref 100–199)
CO2 SERPL-SCNC: 23 MMOL/L (ref 20–29)
CREAT SERPL-MCNC: 0.98 MG/DL (ref 0.76–1.27)
CREAT UR-MCNC: 227.3 MG/DL
EGFRCR SERPLBLD CKD-EPI 2021: 92 ML/MIN/1.73
GLOBULIN SER CALC-MCNC: 2.5 G/DL (ref 1.5–4.5)
GLUCOSE SERPL-MCNC: 119 MG/DL (ref 65–99)
HDLC SERPL-MCNC: 38 MG/DL
LABORATORY COMMENT REPORT: ABNORMAL
LDLC SERPL CALC-MCNC: 88 MG/DL (ref 0–99)
MICROALBUMIN UR-MCNC: 6.2 UG/ML
POTASSIUM SERPL-SCNC: 4.4 MMOL/L (ref 3.5–5.2)
PROT SERPL-MCNC: 6.7 G/DL (ref 6–8.5)
SODIUM SERPL-SCNC: 141 MMOL/L (ref 134–144)
TRIGL SERPL-MCNC: 114 MG/DL (ref 0–149)
VLDLC SERPL CALC-MCNC: 21 MG/DL (ref 5–40)

## 2022-05-17 ENCOUNTER — OFFICE VISIT (OUTPATIENT)
Dept: MEDICAL GROUP | Facility: MEDICAL CENTER | Age: 57
End: 2022-05-17
Payer: COMMERCIAL

## 2022-05-17 VITALS
OXYGEN SATURATION: 96 % | HEART RATE: 97 BPM | TEMPERATURE: 97 F | HEIGHT: 70 IN | SYSTOLIC BLOOD PRESSURE: 107 MMHG | WEIGHT: 197.6 LBS | DIASTOLIC BLOOD PRESSURE: 60 MMHG | BODY MASS INDEX: 28.29 KG/M2

## 2022-05-17 DIAGNOSIS — I10 ESSENTIAL HYPERTENSION: ICD-10-CM

## 2022-05-17 DIAGNOSIS — E11.9 CONTROLLED TYPE 2 DIABETES MELLITUS WITHOUT COMPLICATION, WITHOUT LONG-TERM CURRENT USE OF INSULIN (HCC): ICD-10-CM

## 2022-05-17 DIAGNOSIS — E78.00 PURE HYPERCHOLESTEROLEMIA: ICD-10-CM

## 2022-05-17 DIAGNOSIS — R21 RASH: ICD-10-CM

## 2022-05-17 LAB
HBA1C MFR BLD: 6.1 % (ref 0–5.6)
INT CON NEG: NEGATIVE
INT CON POS: POSITIVE

## 2022-05-17 PROCEDURE — 83036 HEMOGLOBIN GLYCOSYLATED A1C: CPT | Performed by: FAMILY MEDICINE

## 2022-05-17 PROCEDURE — 99214 OFFICE O/P EST MOD 30 MIN: CPT | Performed by: FAMILY MEDICINE

## 2022-05-17 RX ORDER — ATORVASTATIN CALCIUM 20 MG/1
20 TABLET, FILM COATED ORAL DAILY
Qty: 90 TABLET | Refills: 4 | Status: SHIPPED | OUTPATIENT
Start: 2022-05-17 | End: 2023-06-13 | Stop reason: SDUPTHER

## 2022-05-17 RX ORDER — CLOTRIMAZOLE AND BETAMETHASONE DIPROPIONATE 10; .64 MG/G; MG/G
CREAM TOPICAL
Qty: 45 G | Refills: 0 | Status: SHIPPED | OUTPATIENT
Start: 2022-05-17 | End: 2022-11-11

## 2022-05-17 RX ORDER — TRIAMCINOLONE ACETONIDE 1 MG/G
1 CREAM TOPICAL 2 TIMES DAILY
COMMUNITY
Start: 2022-02-22 | End: 2022-11-11

## 2022-05-17 ASSESSMENT — FIBROSIS 4 INDEX: FIB4 SCORE: 0.63

## 2022-05-17 ASSESSMENT — PATIENT HEALTH QUESTIONNAIRE - PHQ9: CLINICAL INTERPRETATION OF PHQ2 SCORE: 0

## 2022-05-17 NOTE — PROGRESS NOTES
Subjective:     CC: Diagnoses of Controlled type 2 diabetes mellitus without complication, without long-term current use of insulin ([facility]), Pure hypercholesterolemia, Essential hypertension, and Rash were pertinent to this visit.    HPI: Patient is a 56 y.o. male established patient who presents today for general 6 month follow up.       Pure hypercholesterolemia  Chronic problem. Currently on atrovastatin 20mg, not 40mg. LDL remains controlled around 80.   He feels less muscle aches on the 1/2 dose. Would like Rx for that dose.     Essential hypertension  Chronic problem. BP well controlled with losartan 50mg.     Controlled type 2 diabetes mellitus without complication, without long-term current use of insulin ([facility])  Chronic problem. Currently on metformin 500mg daily.   A1C well  Controlled at 6.6%.   Had retinal screening about 1 yr ago, due in July.   On statin and ARB  Denies any numbness or tingling in the feet    Rash  Rt upper back  Tried cortisone, unsure it if helped, didn't use it for long.   Erythematous, flaky.       Past Medical History:   Diagnosis Date   • Allergy    • Cancer ([facility]) 2019    skin   • Diabetes ([facility])     oral meds   • High cholesterol    • Hypertension    • Psychiatric problem     depression       Social History     Tobacco Use   • Smoking status: Never Smoker   • Smokeless tobacco: Never Used   Vaping Use   • Vaping Use: Never used   Substance Use Topics   • Alcohol use: Not Currently     Comment: 2/week   • Drug use: Never       Current Outpatient Medications Ordered in Epic   Medication Sig Dispense Refill   • triamcinolone acetonide (KENALOG) 0.1 % Cream Apply 1 Application topically 2 times a day. APPLY TO AFFECTED AREA TWICE A DAY     • atorvastatin (LIPITOR) 20 MG Tab Take 1 Tablet by mouth every day. Take 1 tablet by mouth every night at bedtime 90 Tablet 4   • clotrimazole-betamethasone (LOTRISONE) 1-0.05 % Cream Apply thin layer to affected area BID for 2 weeks. 45 g 0   •  "metFORMIN (GLUCOPHAGE) 500 MG Tab Take 1 Tablet by mouth every day. 90 Tablet 3   • losartan (COZAAR) 50 MG Tab Take 1 Tablet by mouth every day. 90 Tablet 3     No current Epic-ordered facility-administered medications on file.       Allergies:  Patient has no known allergies.    Health Maintenance: Completed    ROS:  Gen: no fevers/chill, no changes in weight  Eyes: no changes in vision  ENT: no sore throat, no hearing loss, no bloody nose  Pulm: no sob, no cough  CV: no chest pain, no palpitations  GI: no nausea/vomiting, no diarrhea  : no dysuria  MSk: no myalgias  Skin: no rash  Neuro: no headaches, no numbness/tingling  Heme/Lymph: no easy bruising      Objective:       Exam:  /60 (BP Location: Left arm, Patient Position: Sitting, BP Cuff Size: Adult long)   Pulse 97   Temp 36.1 °C (97 °F) (Temporal)   Ht 1.778 m (5' 10\")   Wt 89.6 kg (197 lb 9.6 oz)   SpO2 96%   BMI 28.35 kg/m²  Body mass index is 28.35 kg/m².      General: Normal appearing. No distress.  HEAD: NCAT  EYES: conjunctiva clear, lids without ptosis, pupils equal  and reactive to light  EARS: ears normal shape and contour, canals are clear bilaterally, TMs clear  Neck: Supple without masses. Thyroid is not enlarged. Normal ROM  Pulmonary: Clear to ausculation.  Normal effort. No rales, ronchi, or wheezing.  Cardiovascular: Regular rate and rhythm, no murmur. No LE edema  Neurologic: Grossly normal, no focal deficits  Lymph: No cervical or supraclavicular lymph nodes are palpable  Skin: Warm and dry.  No obvious lesions.  Musculoskeletal: Normal gait and station.   Psych: Normal mood and affect. Alert and oriented x3. Judgment and insight is normal.    Labs: 5/9/22 Results reviewed and discussed with the patient, questions answered.    Assessment & Plan:     56 y.o. male with the following -     1. Controlled type 2 diabetes mellitus without complication, without long-term current use of insulin (HCC)  Chronic problem. Well controlled " on metformin 500mg daily. A1C 6.6.%    - POCT A1C    2. Pure hypercholesterolemia  Chronic problem, even on the 20 mg dose, and the patient's lipid panel still well controlled.  Plan to switch to 20 mg.  Refill placed.  - atorvastatin (LIPITOR) 20 MG Tab; Take 1 Tablet by mouth every day. Take 1 tablet by mouth every night at bedtime  Dispense: 90 Tablet; Refill: 4    3. Essential hypertension  Chronic problem, well-controlled on losartan.  Reviewed metabolic panel which looked good.    4. Rash  New problem.  Patient has a rash on his right back, itchy, consistent with possible fungal infection.  Prescription given for Lotrisone.  Follow-up if no improvement.  - clotrimazole-betamethasone (LOTRISONE) 1-0.05 % Cream; Apply thin layer to affected area BID for 2 weeks.  Dispense: 45 g; Refill: 0      Return in about 6 months (around 11/17/2022).    Please note that this dictation was created using voice recognition software. I have made every reasonable attempt to correct obvious errors, but I expect that there are errors of grammar and possibly content that I did not discover before finalizing the note.

## 2022-05-17 NOTE — ASSESSMENT & PLAN NOTE
Chronic problem. Currently on metformin 500mg daily.   A1C well  Controlled at 6.6%.   Had retinal screening about 1 yr ago, due in July.   On statin and ARB  Denies any numbness or tingling in the feet

## 2022-05-17 NOTE — ASSESSMENT & PLAN NOTE
Chronic problem. Currently on atrovastatin 20mg, no 40mg. LDL remains controlled around 80.   He feels less muscle aches on the 1/2 dose. Would like Rx for that dose.

## 2022-11-08 DIAGNOSIS — E11.9 CONTROLLED TYPE 2 DIABETES MELLITUS WITHOUT COMPLICATION, WITHOUT LONG-TERM CURRENT USE OF INSULIN (HCC): ICD-10-CM

## 2022-11-09 DIAGNOSIS — E11.9 CONTROLLED TYPE 2 DIABETES MELLITUS WITHOUT COMPLICATION, WITHOUT LONG-TERM CURRENT USE OF INSULIN (HCC): ICD-10-CM

## 2022-11-09 DIAGNOSIS — I10 ESSENTIAL HYPERTENSION: ICD-10-CM

## 2022-11-09 NOTE — TELEPHONE ENCOUNTER
Received request via: Pharmacy    Was the patient seen in the last year in this department? Yes    Does the patient have an active prescription (recently filled or refills available) for medication(s) requested? No    Does the patient have half-way Plus and need 100 day supply (blood pressure, diabetes and cholesterol meds only)? Patient does not have SCP

## 2022-11-10 RX ORDER — LOSARTAN POTASSIUM 50 MG/1
50 TABLET ORAL
Qty: 90 TABLET | Refills: 3 | Status: SHIPPED | OUTPATIENT
Start: 2022-11-10 | End: 2023-06-13 | Stop reason: SDUPTHER

## 2022-11-10 NOTE — TELEPHONE ENCOUNTER
Received request via: Pharmacy    Was the patient seen in the last year in this department? Yes    Does the patient have an active prescription (recently filled or refills available) for medication(s) requested? No    Does the patient have [facility]FP Plus and need 100 day supply (blood pressure, diabetes and cholesterol meds only)? Patient does not have SCP

## 2022-11-11 ENCOUNTER — OFFICE VISIT (OUTPATIENT)
Dept: MEDICAL GROUP | Facility: MEDICAL CENTER | Age: 57
End: 2022-11-11
Payer: COMMERCIAL

## 2022-11-11 VITALS
TEMPERATURE: 97.8 F | OXYGEN SATURATION: 97 % | HEART RATE: 65 BPM | HEIGHT: 70 IN | SYSTOLIC BLOOD PRESSURE: 122 MMHG | DIASTOLIC BLOOD PRESSURE: 68 MMHG | BODY MASS INDEX: 27.92 KG/M2 | WEIGHT: 195 LBS

## 2022-11-11 DIAGNOSIS — D35.2 PITUITARY ADENOMA (HCC): ICD-10-CM

## 2022-11-11 DIAGNOSIS — F33.0 MILD EPISODE OF RECURRENT MAJOR DEPRESSIVE DISORDER (HCC): ICD-10-CM

## 2022-11-11 DIAGNOSIS — I10 ESSENTIAL HYPERTENSION: ICD-10-CM

## 2022-11-11 DIAGNOSIS — E78.00 PURE HYPERCHOLESTEROLEMIA: ICD-10-CM

## 2022-11-11 DIAGNOSIS — R20.0 NUMBNESS OF FINGERS: ICD-10-CM

## 2022-11-11 DIAGNOSIS — E11.9 CONTROLLED TYPE 2 DIABETES MELLITUS WITHOUT COMPLICATION, WITHOUT LONG-TERM CURRENT USE OF INSULIN (HCC): ICD-10-CM

## 2022-11-11 LAB
HBA1C MFR BLD: 6.3 % (ref 0–5.6)
INT CON NEG: NEGATIVE
INT CON POS: POSITIVE

## 2022-11-11 PROCEDURE — 99214 OFFICE O/P EST MOD 30 MIN: CPT | Performed by: FAMILY MEDICINE

## 2022-11-11 PROCEDURE — 83036 HEMOGLOBIN GLYCOSYLATED A1C: CPT | Performed by: FAMILY MEDICINE

## 2022-11-11 ASSESSMENT — FIBROSIS 4 INDEX: FIB4 SCORE: 0.64

## 2022-11-11 NOTE — ASSESSMENT & PLAN NOTE
Chronic problem. Currently on atrovastatin 20mg. LDL remains controlled around 80.   He feels less muscle aches on the 1/2 dose. Would like Rx for that dose.   Denies any numbness or tingling in the feet.

## 2022-11-11 NOTE — ASSESSMENT & PLAN NOTE
Followed by [facility].   Asymptomatic.   Last MRI on file is  feb 2021. rpeorts repeat MRI done in October 2021.   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 When compared with the previous MRI there has been interval reduction in the size of the lesion.

## 2022-11-11 NOTE — PROGRESS NOTES
Subjective:     CC: Diagnoses of Controlled type 2 diabetes mellitus without complication, without long-term current use of insulin (HCC), Essential hypertension, Mild episode of recurrent major depressive disorder (HCC), Pituitary adenoma (HCC), Pure hypercholesterolemia, and Numbness of fingers were pertinent to this visit.    HPI: Patient is a 57 y.o. male established patient who presents today for general 6 month follow up.       Controlled type 2 diabetes mellitus without complication, without long-term current use of insulin (HCC)  Chronic problem. Currently on metformin 500mg daily.   A1C well  Controlled at 6.3%.   Had retinal screening about over the summer.   On statin and ARB  Reports numbness in the finger tips. All 10.     Essential hypertension  Chronic problem. BP well controlled with losartan 50mg.     Mild episode of recurrent major depressive disorder (HCC)  Mood has improved quite a bit. Denies any signifiacnt depression. Not on any medications.     Pituitary adenoma (Former[facility])  Followed by [facility].   Asymptomatic.   Last MRI on file is  feb 2021. rpeorts repeat MRI done in October 2021.   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 When compared with the previous MRI there has been interval reduction in the size of the lesion.    Pure hypercholesterolemia  Chronic problem. Currently on atrovastatin 20mg. LDL remains controlled around 80.   He feels less muscle aches on the 1/2 dose. Would like Rx for that dose.   Denies any numbness or tingling in the feet.     Past Medical History:   Diagnosis Date    Allergy     Cancer (HCC) 2019    skin    Diabetes ([facility])     oral meds    High cholesterol     Hypertension     Psychiatric problem     depression       Social History     Tobacco Use    Smoking status: Never    Smokeless tobacco: Never   Vaping Use    Vaping Use: Never used   Substance Use Topics  "   Alcohol use: Not Currently     Comment: 2/week    Drug use: Never       Current Outpatient Medications Ordered in Epic   Medication Sig Dispense Refill    metFORMIN (GLUCOPHAGE) 500 MG Tab TAKE 1 TABLET BY MOUTH EVERY DAY 90 Tablet 3    losartan (COZAAR) 50 MG Tab TAKE 1 TABLET BY MOUTH EVERY DAY 90 Tablet 3    atorvastatin (LIPITOR) 20 MG Tab Take 1 Tablet by mouth every day. Take 1 tablet by mouth every night at bedtime 90 Tablet 4    clotrimazole-betamethasone (LOTRISONE) 1-0.05 % Cream Apply thin layer to affected area BID for 2 weeks. (Patient not taking: Reported on 11/11/2022) 45 g 0     No current Epic-ordered facility-administered medications on file.       Allergies:  Patient has no known allergies.    Health Maintenance: Completed        Objective:       Exam:  /68 (BP Location: Right arm, Patient Position: Sitting, BP Cuff Size: Adult long)   Pulse 65   Temp 36.6 °C (97.8 °F) (Temporal)   Ht 1.778 m (5' 10\")   Wt 88.5 kg (195 lb)   SpO2 97%   BMI 27.98 kg/m²  Body mass index is 27.98 kg/m².    General: Normal appearing. No distress.  HEAD: NCAT  EYES: conjunctiva clear, lids without ptosis, pupils equal and reactive to light  EARS: ears normal shape and contour.  Neck:  Normal ROM  Pulmonary: Normal effort. Normal respiratory rate.  Cardiovascular: Well perfused. No LE edema  Neurologic: Grossly normal, no focal deficits  Skin: Warm and dry.  No obvious lesions.  Musculoskeletal: Normal gait and station.   Psych: Normal mood and affect. Alert and oriented x3. Judgment and insight is normal.    Labs: 5/9/22 Results reviewed and discussed with the patient, questions answered.    Assessment & Plan:     57 y.o. male with the following -     1. Controlled type 2 diabetes mellitus without complication, without long-term current use of insulin (HCC)  Chronic problem. Well controlled on metformin 500mg daily  Taking as statin and ARB.     - POCT A1C  - Comp Metabolic Panel; Future    2. Essential " hypertension  Chronic problem. Well controlled on Losartan 50mg.     3. Mild episode of recurrent major depressive disorder (HCC)  Chronic problem. Doing quite well.     4. Pituitary adenoma (HCC)  Chronic problem. Due for MRI, followed by neurosurgery.     5. Pure hypercholesterolemia  Chronic problem. Well controlled on atorvastatin 20mg. Last lipid panel done in May.     6. Numbness of fingers  New problem. Plan to start with labs work. Denies any alcohol use. Reports numbness in all 10 finger tips. No pain. No loss of function.   - VITAMIN B12; Future  - VITAMIN B1; Future  - CBC WITH DIFFERENTIAL; Future  - TSH WITH REFLEX TO FT4; Future      Return in about 6 weeks (around 12/23/2022) for follow up labs and numbness.    Please note that this dictation was created using voice recognition software. I have made every reasonable attempt to correct obvious errors, but I expect that there are errors of grammar and possibly content that I did not discover before finalizing the note.

## 2022-11-11 NOTE — ASSESSMENT & PLAN NOTE
Chronic problem. Currently on metformin 500mg daily.   A1C well  Controlled at 6.3%.   Had retinal screening about over the summer.   On statin and ARB  Reports numbness in the finger tips. All 10.

## 2022-11-14 RX ORDER — LOSARTAN POTASSIUM 50 MG/1
50 TABLET ORAL
Qty: 90 TABLET | Refills: 3 | Status: SHIPPED | OUTPATIENT
Start: 2022-11-14 | End: 2023-05-05

## 2022-11-22 LAB
ALBUMIN SERPL-MCNC: 4.4 G/DL (ref 3.8–4.9)
ALBUMIN/GLOB SERPL: 1.9 {RATIO} (ref 1.2–2.2)
ALP SERPL-CCNC: 87 IU/L (ref 44–121)
ALT SERPL-CCNC: 17 IU/L (ref 0–44)
AST SERPL-CCNC: 19 IU/L (ref 0–40)
BASOPHILS # BLD AUTO: 0.1 X10E3/UL (ref 0–0.2)
BASOPHILS NFR BLD AUTO: 1 %
BILIRUB SERPL-MCNC: 0.5 MG/DL (ref 0–1.2)
BUN SERPL-MCNC: 16 MG/DL (ref 6–24)
BUN/CREAT SERPL: 14 (ref 9–20)
CALCIUM SERPL-MCNC: 9.6 MG/DL (ref 8.7–10.2)
CHLORIDE SERPL-SCNC: 105 MMOL/L (ref 96–106)
CO2 SERPL-SCNC: 22 MMOL/L (ref 20–29)
CREAT SERPL-MCNC: 1.16 MG/DL (ref 0.76–1.27)
EGFRCR SERPLBLD CKD-EPI 2021: 74 ML/MIN/1.73
EOSINOPHIL # BLD AUTO: 0.1 X10E3/UL (ref 0–0.4)
EOSINOPHIL NFR BLD AUTO: 1 %
ERYTHROCYTE [DISTWIDTH] IN BLOOD BY AUTOMATED COUNT: 11.8 % (ref 11.6–15.4)
GLOBULIN SER CALC-MCNC: 2.3 G/DL (ref 1.5–4.5)
GLUCOSE SERPL-MCNC: 126 MG/DL (ref 70–99)
HCT VFR BLD AUTO: 46.1 % (ref 37.5–51)
HGB BLD-MCNC: 15.5 G/DL (ref 13–17.7)
IMM GRANULOCYTES # BLD AUTO: 0 X10E3/UL (ref 0–0.1)
IMM GRANULOCYTES NFR BLD AUTO: 0 %
IMMATURE CELLS  115398: ABNORMAL
LYMPHOCYTES # BLD AUTO: 3.4 X10E3/UL (ref 0.7–3.1)
LYMPHOCYTES NFR BLD AUTO: 37 %
MCH RBC QN AUTO: 29.9 PG (ref 26.6–33)
MCHC RBC AUTO-ENTMCNC: 33.6 G/DL (ref 31.5–35.7)
MCV RBC AUTO: 89 FL (ref 79–97)
MONOCYTES # BLD AUTO: 0.6 X10E3/UL (ref 0.1–0.9)
MONOCYTES NFR BLD AUTO: 6 %
MORPHOLOGY BLD-IMP: ABNORMAL
NEUTROPHILS # BLD AUTO: 5.2 X10E3/UL (ref 1.4–7)
NEUTROPHILS NFR BLD AUTO: 55 %
NRBC BLD AUTO-RTO: ABNORMAL %
PLATELET # BLD AUTO: 307 X10E3/UL (ref 150–450)
POTASSIUM SERPL-SCNC: 4.9 MMOL/L (ref 3.5–5.2)
PROT SERPL-MCNC: 6.7 G/DL (ref 6–8.5)
RBC # BLD AUTO: 5.18 X10E6/UL (ref 4.14–5.8)
SODIUM SERPL-SCNC: 139 MMOL/L (ref 134–144)
TSH SERPL DL<=0.005 MIU/L-ACNC: 1.33 UIU/ML (ref 0.45–4.5)
VIT B1 BLD-SCNC: 198.2 NMOL/L (ref 66.5–200)
VIT B12 SERPL-MCNC: 546 PG/ML (ref 232–1245)
WBC # BLD AUTO: 9.4 X10E3/UL (ref 3.4–10.8)

## 2023-05-03 SDOH — ECONOMIC STABILITY: INCOME INSECURITY: IN THE LAST 12 MONTHS, WAS THERE A TIME WHEN YOU WERE NOT ABLE TO PAY THE MORTGAGE OR RENT ON TIME?: NO

## 2023-05-03 SDOH — ECONOMIC STABILITY: HOUSING INSECURITY
IN THE LAST 12 MONTHS, WAS THERE A TIME WHEN YOU DID NOT HAVE A STEADY PLACE TO SLEEP OR SLEPT IN A SHELTER (INCLUDING NOW)?: NO

## 2023-05-03 SDOH — ECONOMIC STABILITY: TRANSPORTATION INSECURITY
IN THE PAST 12 MONTHS, HAS THE LACK OF TRANSPORTATION KEPT YOU FROM MEDICAL APPOINTMENTS OR FROM GETTING MEDICATIONS?: NO

## 2023-05-03 SDOH — ECONOMIC STABILITY: FOOD INSECURITY: WITHIN THE PAST 12 MONTHS, THE FOOD YOU BOUGHT JUST DIDN'T LAST AND YOU DIDN'T HAVE MONEY TO GET MORE.: NEVER TRUE

## 2023-05-03 SDOH — ECONOMIC STABILITY: FOOD INSECURITY: WITHIN THE PAST 12 MONTHS, YOU WORRIED THAT YOUR FOOD WOULD RUN OUT BEFORE YOU GOT MONEY TO BUY MORE.: NEVER TRUE

## 2023-05-03 SDOH — ECONOMIC STABILITY: INCOME INSECURITY: HOW HARD IS IT FOR YOU TO PAY FOR THE VERY BASICS LIKE FOOD, HOUSING, MEDICAL CARE, AND HEATING?: NOT VERY HARD

## 2023-05-03 SDOH — ECONOMIC STABILITY: TRANSPORTATION INSECURITY
IN THE PAST 12 MONTHS, HAS LACK OF RELIABLE TRANSPORTATION KEPT YOU FROM MEDICAL APPOINTMENTS, MEETINGS, WORK OR FROM GETTING THINGS NEEDED FOR DAILY LIVING?: NO

## 2023-05-03 SDOH — HEALTH STABILITY: PHYSICAL HEALTH

## 2023-05-03 SDOH — ECONOMIC STABILITY: TRANSPORTATION INSECURITY
IN THE PAST 12 MONTHS, HAS LACK OF TRANSPORTATION KEPT YOU FROM MEETINGS, WORK, OR FROM GETTING THINGS NEEDED FOR DAILY LIVING?: NO

## 2023-05-03 SDOH — HEALTH STABILITY: MENTAL HEALTH

## 2023-05-03 SDOH — ECONOMIC STABILITY: HOUSING INSECURITY

## 2023-05-03 ASSESSMENT — SOCIAL DETERMINANTS OF HEALTH (SDOH)
IN A TYPICAL WEEK, HOW MANY TIMES DO YOU TALK ON THE PHONE WITH FAMILY, FRIENDS, OR NEIGHBORS?: MORE THAN THREE TIMES A WEEK
DO YOU BELONG TO ANY CLUBS OR ORGANIZATIONS SUCH AS CHURCH GROUPS UNIONS, FRATERNAL OR ATHLETIC GROUPS, OR SCHOOL GROUPS?: NO
HOW OFTEN DO YOU HAVE A DRINK CONTAINING ALCOHOL: 2-4 TIMES A MONTH
HOW OFTEN DO YOU GET TOGETHER WITH FRIENDS OR RELATIVES?: TWICE A WEEK
HOW MANY DRINKS CONTAINING ALCOHOL DO YOU HAVE ON A TYPICAL DAY WHEN YOU ARE DRINKING: 1 OR 2
HOW HARD IS IT FOR YOU TO PAY FOR THE VERY BASICS LIKE FOOD, HOUSING, MEDICAL CARE, AND HEATING?: NOT VERY HARD
WITHIN THE PAST 12 MONTHS, YOU WORRIED THAT YOUR FOOD WOULD RUN OUT BEFORE YOU GOT THE MONEY TO BUY MORE: NEVER TRUE
HOW OFTEN DO YOU HAVE SIX OR MORE DRINKS ON ONE OCCASION: LESS THAN MONTHLY
HOW OFTEN DO YOU ATTENT MEETINGS OF THE CLUB OR ORGANIZATION YOU BELONG TO?: NEVER
IN A TYPICAL WEEK, HOW MANY TIMES DO YOU TALK ON THE PHONE WITH FAMILY, FRIENDS, OR NEIGHBORS?: MORE THAN THREE TIMES A WEEK
HOW OFTEN DO YOU ATTEND CHURCH OR RELIGIOUS SERVICES?: NEVER
HOW OFTEN DO YOU ATTEND CHURCH OR RELIGIOUS SERVICES?: NEVER
HOW OFTEN DO YOU ATTENT MEETINGS OF THE CLUB OR ORGANIZATION YOU BELONG TO?: NEVER
DO YOU BELONG TO ANY CLUBS OR ORGANIZATIONS SUCH AS CHURCH GROUPS UNIONS, FRATERNAL OR ATHLETIC GROUPS, OR SCHOOL GROUPS?: NO
HOW OFTEN DO YOU GET TOGETHER WITH FRIENDS OR RELATIVES?: TWICE A WEEK

## 2023-05-03 ASSESSMENT — LIFESTYLE VARIABLES
HOW OFTEN DO YOU HAVE SIX OR MORE DRINKS ON ONE OCCASION: LESS THAN MONTHLY
HOW OFTEN DO YOU HAVE A DRINK CONTAINING ALCOHOL: 2-4 TIMES A MONTH
SKIP TO QUESTIONS 9-10: 0
HOW MANY STANDARD DRINKS CONTAINING ALCOHOL DO YOU HAVE ON A TYPICAL DAY: 1 OR 2
AUDIT-C TOTAL SCORE: 3

## 2023-05-05 ENCOUNTER — OFFICE VISIT (OUTPATIENT)
Dept: MEDICAL GROUP | Facility: PHYSICIAN GROUP | Age: 58
End: 2023-05-05
Payer: COMMERCIAL

## 2023-05-05 VITALS
BODY MASS INDEX: 29.36 KG/M2 | OXYGEN SATURATION: 97 % | RESPIRATION RATE: 16 BRPM | WEIGHT: 198.2 LBS | SYSTOLIC BLOOD PRESSURE: 126 MMHG | TEMPERATURE: 97.5 F | DIASTOLIC BLOOD PRESSURE: 70 MMHG | HEART RATE: 82 BPM | HEIGHT: 69 IN

## 2023-05-05 DIAGNOSIS — I10 ESSENTIAL HYPERTENSION: ICD-10-CM

## 2023-05-05 DIAGNOSIS — E55.9 VITAMIN D DEFICIENCY: ICD-10-CM

## 2023-05-05 DIAGNOSIS — E11.9 CONTROLLED TYPE 2 DIABETES MELLITUS WITHOUT COMPLICATION, WITHOUT LONG-TERM CURRENT USE OF INSULIN (HCC): ICD-10-CM

## 2023-05-05 DIAGNOSIS — E78.00 PURE HYPERCHOLESTEROLEMIA: ICD-10-CM

## 2023-05-05 DIAGNOSIS — F33.0 MILD EPISODE OF RECURRENT MAJOR DEPRESSIVE DISORDER (HCC): ICD-10-CM

## 2023-05-05 DIAGNOSIS — Z12.5 SCREENING FOR PROSTATE CANCER: ICD-10-CM

## 2023-05-05 DIAGNOSIS — D35.2 PITUITARY ADENOMA (HCC): ICD-10-CM

## 2023-05-05 PROCEDURE — 99214 OFFICE O/P EST MOD 30 MIN: CPT | Performed by: FAMILY MEDICINE

## 2023-05-05 RX ORDER — BENZONATATE 200 MG/1
CAPSULE ORAL
COMMUNITY
Start: 2023-04-21 | End: 2023-05-05

## 2023-05-05 RX ORDER — AZITHROMYCIN 500 MG/1
TABLET, FILM COATED ORAL
COMMUNITY
Start: 2023-04-21 | End: 2023-05-05

## 2023-05-05 RX ORDER — METHYLPREDNISOLONE 4 MG/1
TABLET ORAL
COMMUNITY
Start: 2023-04-21 | End: 2023-05-05

## 2023-05-05 RX ORDER — ALBUTEROL SULFATE 90 UG/1
AEROSOL, METERED RESPIRATORY (INHALATION)
COMMUNITY
Start: 2023-04-21 | End: 2023-11-08

## 2023-05-05 ASSESSMENT — FIBROSIS 4 INDEX: FIB4 SCORE: 0.83

## 2023-05-05 ASSESSMENT — PATIENT HEALTH QUESTIONNAIRE - PHQ9
SUM OF ALL RESPONSES TO PHQ QUESTIONS 1-9: 0
1. LITTLE INTEREST OR PLEASURE IN DOING THINGS: NOT AT ALL
7. TROUBLE CONCENTRATING ON THINGS, SUCH AS READING THE NEWSPAPER OR WATCHING TELEVISION: NOT AT ALL
8. MOVING OR SPEAKING SO SLOWLY THAT OTHER PEOPLE COULD HAVE NOTICED. OR THE OPPOSITE, BEING SO FIGETY OR RESTLESS THAT YOU HAVE BEEN MOVING AROUND A LOT MORE THAN USUAL: NOT AT ALL
3. TROUBLE FALLING OR STAYING ASLEEP OR SLEEPING TOO MUCH: NOT AT ALL
5. POOR APPETITE OR OVEREATING: NOT AT ALL
6. FEELING BAD ABOUT YOURSELF - OR THAT YOU ARE A FAILURE OR HAVE LET YOURSELF OR YOUR FAMILY DOWN: NOT AL ALL
9. THOUGHTS THAT YOU WOULD BE BETTER OFF DEAD, OR OF HURTING YOURSELF: NOT AT ALL
2. FEELING DOWN, DEPRESSED, IRRITABLE, OR HOPELESS: NOT AT ALL
SUM OF ALL RESPONSES TO PHQ9 QUESTIONS 1 AND 2: 0
4. FEELING TIRED OR HAVING LITTLE ENERGY: NOT AT ALL

## 2023-05-05 NOTE — PROGRESS NOTES
Subjective:     CC:   Chief Complaint   Patient presents with    Establish Care       HPI:   Victor Hugo presents today to establish care.  Patient does see a neurosurgeon once a year due to the fact that he had a pituitary adenoma it has since been removed but he falls the neurosurgeon yearly and gets his MRIs done there.  Patient is also due to get some lab work done in May for his diabetes.  Patient states he is doing well with no problems.    Past Medical History:   Diagnosis Date    Allergy     Cancer (HCC) 2019    skin    Diabetes (HCC)     oral meds    High cholesterol     Hypertension     Psychiatric problem     depression       Social History     Tobacco Use    Smoking status: Never    Smokeless tobacco: Never   Vaping Use    Vaping Use: Never used   Substance Use Topics    Alcohol use: Not Currently     Comment: 2/week    Drug use: Never       Current Outpatient Medications Ordered in Epic   Medication Sig Dispense Refill    albuterol 108 (90 Base) MCG/ACT Aero Soln inhalation aerosol       metFORMIN (GLUCOPHAGE) 500 MG Tab Take 1 Tablet by mouth every day. 90 Tablet 3    losartan (COZAAR) 50 MG Tab TAKE 1 TABLET BY MOUTH EVERY DAY 90 Tablet 3    atorvastatin (LIPITOR) 20 MG Tab Take 1 Tablet by mouth every day. Take 1 tablet by mouth every night at bedtime 90 Tablet 4     No current Epic-ordered facility-administered medications on file.       Allergies:  Patient has no known allergies.    Health Maintenance: Completed    ROS:  Gen: no fevers/chills, patient did gain a few pounds  Eyes: no changes in vision  ENT: no sore throat, no hearing loss, no bloody nose  Pulm: no sob, no cough  CV: no chest pain, no palpitations  GI: no nausea/vomiting, no diarrhea  : no dysuria  Neuro: no headaches, no numbness/tingling  Heme/Lymph: no easy bruising    Objective:     Exam:  /70 (BP Location: Right arm, Patient Position: Sitting, BP Cuff Size: Adult)   Pulse 82   Temp 36.4 °C (97.5 °F) (Temporal)   Resp 16   " Ht 1.753 m (5' 9\")   Wt 89.9 kg (198 lb 3.2 oz)   SpO2 97%   BMI 29.27 kg/m²  Body mass index is 29.27 kg/m².    Gen: Alert and oriented, No apparent distress.  Skin: Warm and dry.  No obvious lesions.  Eyes: Sclera wnl Pupils normal in size  Lungs: Normal effort, CTA bilaterally, no wheezes, rhonchi, or rales  CV: Regular rate and rhythm. No murmurs, rubs, or gallops.  ABD: Soft non-tender no organomegaly  Musculoskeletal: Normal gait. No extremity cyanosis, clubbing, or edema.  Neuro: Oriented to person, place and time  Psych: Mood is wnl     Labs: Fasting labs were ordered patient has to go to [facility] patient given his orders    Assessment & Plan:     57 y.o. male with the following -     1. Controlled type 2 diabetes mellitus without complication, without long-term current use of insulin ([facility])  Recommend since he is due for labs in May we will go ahead and order room I would like to see him back in 2 months to go through his labs  - Comp Metabolic Panel; Future  - HEMOGLOBIN A1C; Future  - MICROALBUMIN CREAT RATIO URINE; Future    2. Essential hypertension  Blood pressure is at goal we will continue present med  - CBC WITH DIFFERENTIAL; Future    3. Pure hypercholesterolemia  Recommend checking his cholesterol  - Lipid Profile; Future    4. Mild episode of recurrent major depressive disorder (HCC)  Patient states he is not having any issues with depression at this time and feeling good he is not on any medication for this    5. Vitamin D deficiency  Recommend checking his vitamin D  - VITAMIN D,25 HYDROXY (DEFICIENCY); Future    6. Screening for prostate cancer  Patient has been screened in the past for a PSA last time was about 6 years ago patient would like to go ahead and get this done again  - PROSTATE SPECIFIC AG SCREENING; Future    7. Pituitary adenoma (HCC)  Patient is seeing a neurosurgeon yearly for evaluation to make sure the pituitary adenoma does not recur    Return in about 4 weeks (around " 6/2/2023), or if symptoms worsen or fail to improve.    Please note that this dictation was created using voice recognition software. I have made every reasonable attempt to correct obvious errors, but I expect that there are errors of grammar and possibly content that I did not discover before finalizing the note.

## 2023-05-09 LAB
25(OH)D3+25(OH)D2 SERPL-MCNC: 62.4 NG/ML (ref 30–100)
ALBUMIN SERPL-MCNC: 3.9 G/DL (ref 3.8–4.9)
ALBUMIN/CREAT UR: 3 MG/G CREAT (ref 0–29)
ALBUMIN/GLOB SERPL: 1.9 {RATIO} (ref 1.2–2.2)
ALP SERPL-CCNC: 81 IU/L (ref 44–121)
ALT SERPL-CCNC: 27 IU/L (ref 0–44)
AST SERPL-CCNC: 23 IU/L (ref 0–40)
BASOPHILS # BLD AUTO: 0.1 X10E3/UL (ref 0–0.2)
BASOPHILS NFR BLD AUTO: 1 %
BILIRUB SERPL-MCNC: 0.6 MG/DL (ref 0–1.2)
BUN SERPL-MCNC: 15 MG/DL (ref 6–24)
BUN/CREAT SERPL: 15 (ref 9–20)
CALCIUM SERPL-MCNC: 9.4 MG/DL (ref 8.7–10.2)
CHLORIDE SERPL-SCNC: 105 MMOL/L (ref 96–106)
CHOLEST SERPL-MCNC: 167 MG/DL (ref 100–199)
CO2 SERPL-SCNC: 22 MMOL/L (ref 20–29)
CREAT SERPL-MCNC: 1.01 MG/DL (ref 0.76–1.27)
CREAT UR-MCNC: 115.9 MG/DL
EGFRCR SERPLBLD CKD-EPI 2021: 88 ML/MIN/1.73
EOSINOPHIL # BLD AUTO: 0.2 X10E3/UL (ref 0–0.4)
EOSINOPHIL NFR BLD AUTO: 2 %
ERYTHROCYTE [DISTWIDTH] IN BLOOD BY AUTOMATED COUNT: 11.9 % (ref 11.6–15.4)
GLOBULIN SER CALC-MCNC: 2.1 G/DL (ref 1.5–4.5)
GLUCOSE SERPL-MCNC: 113 MG/DL (ref 70–99)
HBA1C MFR BLD: 6.9 % (ref 4.8–5.6)
HCT VFR BLD AUTO: 45.1 % (ref 37.5–51)
HDLC SERPL-MCNC: 43 MG/DL
HGB BLD-MCNC: 15.2 G/DL (ref 13–17.7)
IMM GRANULOCYTES # BLD AUTO: 0 X10E3/UL (ref 0–0.1)
IMM GRANULOCYTES NFR BLD AUTO: 0 %
IMMATURE CELLS  115398: ABNORMAL
LABORATORY COMMENT REPORT: ABNORMAL
LDLC SERPL CALC-MCNC: 91 MG/DL (ref 0–99)
LYMPHOCYTES # BLD AUTO: 3.5 X10E3/UL (ref 0.7–3.1)
LYMPHOCYTES NFR BLD AUTO: 38 %
MCH RBC QN AUTO: 30.6 PG (ref 26.6–33)
MCHC RBC AUTO-ENTMCNC: 33.7 G/DL (ref 31.5–35.7)
MCV RBC AUTO: 91 FL (ref 79–97)
MICROALBUMIN UR-MCNC: 3 UG/ML
MONOCYTES # BLD AUTO: 0.8 X10E3/UL (ref 0.1–0.9)
MONOCYTES NFR BLD AUTO: 9 %
MORPHOLOGY BLD-IMP: ABNORMAL
NEUTROPHILS # BLD AUTO: 4.8 X10E3/UL (ref 1.4–7)
NEUTROPHILS NFR BLD AUTO: 50 %
NRBC BLD AUTO-RTO: 1 % (ref 0–0)
PLATELET # BLD AUTO: 350 X10E3/UL (ref 150–450)
POTASSIUM SERPL-SCNC: 4.7 MMOL/L (ref 3.5–5.2)
PROT SERPL-MCNC: 6 G/DL (ref 6–8.5)
PSA SERPL-MCNC: 0.7 NG/ML (ref 0–4)
RBC # BLD AUTO: 4.96 X10E6/UL (ref 4.14–5.8)
SODIUM SERPL-SCNC: 140 MMOL/L (ref 134–144)
TRIGL SERPL-MCNC: 194 MG/DL (ref 0–149)
VLDLC SERPL CALC-MCNC: 33 MG/DL (ref 5–40)
WBC # BLD AUTO: 9.4 X10E3/UL (ref 3.4–10.8)

## 2023-06-13 ENCOUNTER — OFFICE VISIT (OUTPATIENT)
Dept: MEDICAL GROUP | Facility: PHYSICIAN GROUP | Age: 58
End: 2023-06-13
Payer: COMMERCIAL

## 2023-06-13 VITALS
TEMPERATURE: 97.2 F | SYSTOLIC BLOOD PRESSURE: 116 MMHG | OXYGEN SATURATION: 99 % | HEIGHT: 69 IN | WEIGHT: 199.3 LBS | HEART RATE: 77 BPM | BODY MASS INDEX: 29.52 KG/M2 | RESPIRATION RATE: 18 BRPM | DIASTOLIC BLOOD PRESSURE: 76 MMHG

## 2023-06-13 DIAGNOSIS — I10 ESSENTIAL HYPERTENSION: ICD-10-CM

## 2023-06-13 DIAGNOSIS — E11.9 CONTROLLED TYPE 2 DIABETES MELLITUS WITHOUT COMPLICATION, WITHOUT LONG-TERM CURRENT USE OF INSULIN (HCC): ICD-10-CM

## 2023-06-13 DIAGNOSIS — E78.00 PURE HYPERCHOLESTEROLEMIA: ICD-10-CM

## 2023-06-13 PROCEDURE — 3078F DIAST BP <80 MM HG: CPT | Performed by: FAMILY MEDICINE

## 2023-06-13 PROCEDURE — 99214 OFFICE O/P EST MOD 30 MIN: CPT | Performed by: FAMILY MEDICINE

## 2023-06-13 PROCEDURE — 3074F SYST BP LT 130 MM HG: CPT | Performed by: FAMILY MEDICINE

## 2023-06-13 RX ORDER — LOSARTAN POTASSIUM 50 MG/1
50 TABLET ORAL
Qty: 90 TABLET | Refills: 3 | Status: SHIPPED | OUTPATIENT
Start: 2023-06-13 | End: 2024-01-05 | Stop reason: SDUPTHER

## 2023-06-13 RX ORDER — ATORVASTATIN CALCIUM 20 MG/1
20 TABLET, FILM COATED ORAL DAILY
Qty: 90 TABLET | Refills: 3 | Status: SHIPPED | OUTPATIENT
Start: 2023-06-13 | End: 2024-01-05 | Stop reason: SDUPTHER

## 2023-06-13 ASSESSMENT — FIBROSIS 4 INDEX: FIB4 SCORE: 0.7

## 2023-06-13 NOTE — PROGRESS NOTES
"Subjective:     CC:   Chief Complaint   Patient presents with    Follow-Up       HPI:   Victor Hugo presents today for follow-up of his labs.  Patient states he is doing well has no complaints.  Patient noticed that his hemoglobin A1c was little bit higher than it normally is.  Patient was given he thinks 5 to 7-day dose of prednisone for respiratory infection in April.    Past Medical History:   Diagnosis Date    Allergy     Cancer (HCC) 2019    skin    Diabetes (HCC)     oral meds    High cholesterol     Hypertension     Psychiatric problem     depression       Social History     Tobacco Use    Smoking status: Never    Smokeless tobacco: Never   Vaping Use    Vaping Use: Never used   Substance Use Topics    Alcohol use: Not Currently     Comment: 2/week    Drug use: Never       Current Outpatient Medications Ordered in Epic   Medication Sig Dispense Refill    atorvastatin (LIPITOR) 20 MG Tab Take 1 Tablet by mouth every day. Take 1 tablet by mouth every night at bedtime 90 Tablet 3    losartan (COZAAR) 50 MG Tab Take 1 Tablet by mouth every day. 90 Tablet 3    metFORMIN (GLUCOPHAGE) 500 MG Tab Take 1 Tablet by mouth every day. 90 Tablet 3    albuterol 108 (90 Base) MCG/ACT Aero Soln inhalation aerosol        No current Epic-ordered facility-administered medications on file.       Allergies:  Patient has no known allergies.    Health Maintenance: Completed    ROS:  Gen: no fevers/chills, no changes in weight  Eyes: no changes in vision  ENT: no sore throat, no hearing loss, no bloody nose  Pulm: no sob, no cough  CV: no chest pain, no palpitations  GI: no nausea/vomiting, no diarrhea  : no dysuria  Neuro: no headaches, no numbness/tingling  Heme/Lymph: no easy bruising    Objective:     Exam:  /76 (BP Location: Left arm, Patient Position: Sitting, BP Cuff Size: Adult)   Pulse 77   Temp 36.2 °C (97.2 °F) (Temporal)   Resp 18   Ht 1.753 m (5' 9\")   Wt 90.4 kg (199 lb 4.8 oz)   SpO2 99%   BMI 29.43 kg/m²  " Body mass index is 29.43 kg/m².    Gen: Alert and oriented, No apparent distress.  Skin: Warm and dry.  On monofilament exam patient feel equally in both feet  Eyes: Sclera wnl Pupils normal in size  Lungs: Normal effort, CTA bilaterally, no wheezes, rhonchi, or rales  CV: Regular rate and rhythm. No murmurs, rubs, or gallops.  Musculoskeletal: Normal gait. No extremity cyanosis, clubbing, or edema.  Neuro: Oriented to person, place and time  Psych: Mood is wnl       Assessment & Plan:     57 y.o. male with the following -     1. Controlled type 2 diabetes mellitus without complication, without long-term current use of insulin ([facility])  Recommend repeat in his hemoglobin A1c again in early November patient very agreeable with this  - HEMOGLOBIN A1C; Future  - Comp Metabolic Panel; Future  - metFORMIN (GLUCOPHAGE) 500 MG Tab; Take 1 Tablet by mouth every day.  Dispense: 90 Tablet; Refill: 3    2. Pure hypercholesterolemia  His lipid panel looks at goal continue present medication  - atorvastatin (LIPITOR) 20 MG Tab; Take 1 Tablet by mouth every day. Take 1 tablet by mouth every night at bedtime  Dispense: 90 Tablet; Refill: 3    3. Essential hypertension  His blood pressure looks at goal continue present medication  - losartan (COZAAR) 50 MG Tab; Take 1 Tablet by mouth every day.  Dispense: 90 Tablet; Refill: 3       Return in about 5 months (around 11/13/2023), or if symptoms worsen or fail to improve.  And over his labs everything is within normal limits except for his hemoglobin A1c and his triglycerides were slightly elevated    Please note that this dictation was created using voice recognition software. I have made every reasonable attempt to correct obvious errors, but I expect that there are errors of grammar and possibly content that I did not discover before finalizing the note.

## 2023-10-04 NOTE — ASSESSMENT & PLAN NOTE
Chronic problem. Last lipid panel November 2020.  Well-controlled.    Component      Latest Ref Rng & Units 11/9/2020           6:04 AM   Cholesterol,Tot      100 - 199 mg/dL 131   Triglycerides      0 - 149 mg/dL 121   HDL      >39 mg/dL 44   VLDL Cholesterol Calc      5 - 40 mg/dL 22   LDL Chol Calc ([facility])      0 - 99 mg/dL 65      Dressing: pressure dressing with telfa

## 2023-11-02 LAB
ALBUMIN SERPL-MCNC: 4.3 G/DL (ref 3.8–4.9)
ALBUMIN/GLOB SERPL: 1.6 {RATIO} (ref 1.2–2.2)
ALP SERPL-CCNC: 85 IU/L (ref 44–121)
ALT SERPL-CCNC: 19 IU/L (ref 0–44)
AST SERPL-CCNC: 17 IU/L (ref 0–40)
BILIRUB SERPL-MCNC: 0.5 MG/DL (ref 0–1.2)
BUN SERPL-MCNC: 14 MG/DL (ref 6–24)
BUN/CREAT SERPL: 13 (ref 9–20)
CALCIUM SERPL-MCNC: 9.6 MG/DL (ref 8.7–10.2)
CHLORIDE SERPL-SCNC: 106 MMOL/L (ref 96–106)
CO2 SERPL-SCNC: 23 MMOL/L (ref 20–29)
CREAT SERPL-MCNC: 1.04 MG/DL (ref 0.76–1.27)
EGFRCR SERPLBLD CKD-EPI 2021: 84 ML/MIN/1.73
GLOBULIN SER CALC-MCNC: 2.7 G/DL (ref 1.5–4.5)
GLUCOSE SERPL-MCNC: 109 MG/DL (ref 70–99)
HBA1C MFR BLD: 6.7 % (ref 4.8–5.6)
POTASSIUM SERPL-SCNC: 5.1 MMOL/L (ref 3.5–5.2)
PROT SERPL-MCNC: 7 G/DL (ref 6–8.5)
SODIUM SERPL-SCNC: 141 MMOL/L (ref 134–144)

## 2023-11-08 ENCOUNTER — OFFICE VISIT (OUTPATIENT)
Dept: MEDICAL GROUP | Facility: PHYSICIAN GROUP | Age: 58
End: 2023-11-08
Payer: COMMERCIAL

## 2023-11-08 VITALS
DIASTOLIC BLOOD PRESSURE: 72 MMHG | HEIGHT: 69 IN | BODY MASS INDEX: 29.62 KG/M2 | RESPIRATION RATE: 16 BRPM | SYSTOLIC BLOOD PRESSURE: 112 MMHG | OXYGEN SATURATION: 97 % | HEART RATE: 88 BPM | WEIGHT: 200 LBS | TEMPERATURE: 97.9 F

## 2023-11-08 DIAGNOSIS — E11.9 CONTROLLED TYPE 2 DIABETES MELLITUS WITHOUT COMPLICATION, WITHOUT LONG-TERM CURRENT USE OF INSULIN (HCC): ICD-10-CM

## 2023-11-08 DIAGNOSIS — Z23 NEED FOR VACCINATION: ICD-10-CM

## 2023-11-08 DIAGNOSIS — I10 ESSENTIAL HYPERTENSION: ICD-10-CM

## 2023-11-08 PROCEDURE — 3078F DIAST BP <80 MM HG: CPT | Performed by: FAMILY MEDICINE

## 2023-11-08 PROCEDURE — 90471 IMMUNIZATION ADMIN: CPT | Performed by: FAMILY MEDICINE

## 2023-11-08 PROCEDURE — 99214 OFFICE O/P EST MOD 30 MIN: CPT | Mod: 25 | Performed by: FAMILY MEDICINE

## 2023-11-08 PROCEDURE — 90686 IIV4 VACC NO PRSV 0.5 ML IM: CPT | Performed by: FAMILY MEDICINE

## 2023-11-08 PROCEDURE — 3074F SYST BP LT 130 MM HG: CPT | Performed by: FAMILY MEDICINE

## 2023-11-08 ASSESSMENT — FIBROSIS 4 INDEX: FIB4 SCORE: 0.62

## 2023-11-08 NOTE — PROGRESS NOTES
"Subjective:     CC:   Chief Complaint   Patient presents with    Follow-Up       HPI:   Victor Hugo presents today for follow-up of his labs.  Patient also is agreeable to get a flu shot.  Patient is doing well and has no complaints at this time.    Past Medical History:   Diagnosis Date    Allergy     Cancer (HCC) 2019    skin    Diabetes (HCC)     oral meds    High cholesterol     Hypertension     Psychiatric problem     depression       Social History     Tobacco Use    Smoking status: Never    Smokeless tobacco: Never   Vaping Use    Vaping Use: Never used   Substance Use Topics    Alcohol use: Not Currently     Comment: 2/week    Drug use: Never       Current Outpatient Medications Ordered in Epic   Medication Sig Dispense Refill    atorvastatin (LIPITOR) 20 MG Tab Take 1 Tablet by mouth every day. Take 1 tablet by mouth every night at bedtime 90 Tablet 3    losartan (COZAAR) 50 MG Tab Take 1 Tablet by mouth every day. 90 Tablet 3    metFORMIN (GLUCOPHAGE) 500 MG Tab Take 1 Tablet by mouth every day. 90 Tablet 3     No current Epic-ordered facility-administered medications on file.       Allergies:  Patient has no known allergies.    Health Maintenance: Completed    ROS:  Gen: no fevers/chills, no changes in weight  Eyes: no changes in vision  ENT: no sore throat, no hearing loss, no bloody nose  Pulm: no sob, no cough  CV: no chest pain, no palpitations  GI: no nausea/vomiting, no diarrhea  : no dysuria  Neuro: no headaches, no numbness/tingling  Heme/Lymph: no easy bruising    Objective:     Exam:  /72 (BP Location: Right arm, Patient Position: Sitting, BP Cuff Size: Adult)   Pulse 88   Temp 36.6 °C (97.9 °F) (Temporal)   Resp 16   Ht 1.753 m (5' 9\")   Wt 90.7 kg (200 lb)   SpO2 97%   BMI 29.53 kg/m²  Body mass index is 29.53 kg/m².    Gen: Alert and oriented, No apparent distress.  Skin: Warm and dry.  No obvious lesions.  Eyes: Sclera wnl Pupils normal in size  Lungs: Normal effort, CTA " bilaterally, no wheezes, rhonchi, or rales  CV: Regular rate and rhythm. No murmurs, rubs, or gallops.  Musculoskeletal: Normal gait. No extremity cyanosis, clubbing, or edema.  Neuro: Oriented to person, place and time  Psych: Mood is wnl       Assessment & Plan:     58 y.o. male with the following -     1. Controlled type 2 diabetes mellitus without complication, without long-term current use of insulin (HCC)  Hemoglobin A1c is 6.7 patient continue present dosage would recommend rechecking this again in February I should see him back then or sooner if there is any problems  - HEMOGLOBIN A1C; Future    2. Essential hypertension  Patient's blood pressure is at goal continue present medication  - CBC WITH DIFFERENTIAL; Future  - Comp Metabolic Panel; Future    3. Need for vaccination  - INFLUENZA VACCINE QUAD INJ (PF)       Return in about 3 months (around 2/8/2024), or if symptoms worsen or fail to improve.    Please note that this dictation was created using voice recognition software. I have made every reasonable attempt to correct obvious errors, but I expect that there are errors of grammar and possibly content that I did not discover before finalizing the note.

## 2024-01-05 DIAGNOSIS — E78.00 PURE HYPERCHOLESTEROLEMIA: ICD-10-CM

## 2024-01-05 DIAGNOSIS — E11.9 CONTROLLED TYPE 2 DIABETES MELLITUS WITHOUT COMPLICATION, WITHOUT LONG-TERM CURRENT USE OF INSULIN (HCC): ICD-10-CM

## 2024-01-05 DIAGNOSIS — I10 ESSENTIAL HYPERTENSION: ICD-10-CM

## 2024-01-05 RX ORDER — LOSARTAN POTASSIUM 50 MG/1
50 TABLET ORAL
Qty: 90 TABLET | Refills: 0 | Status: SHIPPED | OUTPATIENT
Start: 2024-01-05 | End: 2024-02-27

## 2024-01-05 RX ORDER — ATORVASTATIN CALCIUM 20 MG/1
20 TABLET, FILM COATED ORAL DAILY
Qty: 90 TABLET | Refills: 0 | Status: SHIPPED | OUTPATIENT
Start: 2024-01-05 | End: 2024-02-27

## 2024-02-01 ENCOUNTER — OFFICE VISIT (OUTPATIENT)
Dept: URGENT CARE | Facility: PHYSICIAN GROUP | Age: 59
End: 2024-02-01
Payer: COMMERCIAL

## 2024-02-01 VITALS
HEIGHT: 69 IN | RESPIRATION RATE: 16 BRPM | WEIGHT: 202.4 LBS | HEART RATE: 92 BPM | DIASTOLIC BLOOD PRESSURE: 80 MMHG | OXYGEN SATURATION: 94 % | TEMPERATURE: 97.1 F | SYSTOLIC BLOOD PRESSURE: 128 MMHG | BODY MASS INDEX: 29.98 KG/M2

## 2024-02-01 DIAGNOSIS — R09.82 POSTNASAL DRIP: ICD-10-CM

## 2024-02-01 DIAGNOSIS — J02.9 SORE THROAT: ICD-10-CM

## 2024-02-01 DIAGNOSIS — I10 ESSENTIAL HYPERTENSION: ICD-10-CM

## 2024-02-01 DIAGNOSIS — J01.90 ACUTE BACTERIAL RHINOSINUSITIS: ICD-10-CM

## 2024-02-01 DIAGNOSIS — B96.89 ACUTE BACTERIAL RHINOSINUSITIS: ICD-10-CM

## 2024-02-01 LAB — S PYO DNA SPEC NAA+PROBE: NOT DETECTED

## 2024-02-01 PROCEDURE — 3074F SYST BP LT 130 MM HG: CPT | Performed by: STUDENT IN AN ORGANIZED HEALTH CARE EDUCATION/TRAINING PROGRAM

## 2024-02-01 PROCEDURE — 87651 STREP A DNA AMP PROBE: CPT | Performed by: STUDENT IN AN ORGANIZED HEALTH CARE EDUCATION/TRAINING PROGRAM

## 2024-02-01 PROCEDURE — 99213 OFFICE O/P EST LOW 20 MIN: CPT | Performed by: STUDENT IN AN ORGANIZED HEALTH CARE EDUCATION/TRAINING PROGRAM

## 2024-02-01 PROCEDURE — 1125F AMNT PAIN NOTED PAIN PRSNT: CPT | Performed by: STUDENT IN AN ORGANIZED HEALTH CARE EDUCATION/TRAINING PROGRAM

## 2024-02-01 PROCEDURE — 3079F DIAST BP 80-89 MM HG: CPT | Performed by: STUDENT IN AN ORGANIZED HEALTH CARE EDUCATION/TRAINING PROGRAM

## 2024-02-01 RX ORDER — FLUTICASONE PROPIONATE 50 MCG
1 SPRAY, SUSPENSION (ML) NASAL DAILY
Qty: 16 G | Refills: 0 | Status: SHIPPED | OUTPATIENT
Start: 2024-02-01

## 2024-02-01 RX ORDER — AMOXICILLIN AND CLAVULANATE POTASSIUM 875; 125 MG/1; MG/1
1 TABLET, FILM COATED ORAL 2 TIMES DAILY
Qty: 7 TABLET | Refills: 0 | Status: SHIPPED | OUTPATIENT
Start: 2024-02-01

## 2024-02-01 ASSESSMENT — ENCOUNTER SYMPTOMS
NAUSEA: 0
WHEEZING: 0
SORE THROAT: 1
HEADACHES: 1
FEVER: 0
SINUS PAIN: 1
CHILLS: 0
VOMITING: 0
SHORTNESS OF BREATH: 0
PALPITATIONS: 0
SINUS PRESSURE: 1
COUGH: 0
ABDOMINAL PAIN: 0
CONSTIPATION: 0
DIARRHEA: 0

## 2024-02-01 ASSESSMENT — PAIN SCALES - GENERAL: PAINLEVEL: 4=SLIGHT-MODERATE PAIN

## 2024-02-01 ASSESSMENT — FIBROSIS 4 INDEX: FIB4 SCORE: 0.62

## 2024-02-01 NOTE — PROGRESS NOTES
"Subjective     Ruddy Sherwood is a 58 y.o. male who presents with Pharyngitis (01/25/2024), Sinus Problem (Sinus pressure ), and Headache (01/25/2024 on and off )            Ruddy is a 58 y.o. male who presents to urgent care with sore throat, nasal congestion, sinus pain/pressure and sinus headache.  Symptoms started 1 week ago and have been unchanged since onset.  No fever/chills.  Patient concern for strep due to sore throat.  Patient has taken OTC medications to assist with symptoms which has not helped much.  No cough, SOB/wheezing.    Sinus Problem  This is a new problem. Episode onset: 1 week. The problem is unchanged. Associated symptoms include congestion, headaches, sinus pressure and a sore throat. Pertinent negatives include no chills, coughing, ear pain or shortness of breath.       Review of Systems   Constitutional:  Positive for malaise/fatigue. Negative for chills and fever.   HENT:  Positive for congestion, sinus pressure, sinus pain and sore throat. Negative for ear pain.    Respiratory:  Negative for cough, shortness of breath and wheezing.    Cardiovascular:  Negative for chest pain and palpitations.   Gastrointestinal:  Negative for abdominal pain, constipation, diarrhea, nausea and vomiting.   Neurological:  Positive for headaches.   All other systems reviewed and are negative.             Objective     BP (!) 128/90 (BP Location: Right arm, Patient Position: Sitting, BP Cuff Size: Large adult)   Pulse 92   Temp 36.2 °C (97.1 °F) (Temporal)   Resp 16   Ht 1.753 m (5' 9\")   Wt 91.8 kg (202 lb 6.4 oz)   SpO2 94%   BMI 29.89 kg/m²      Physical Exam  Vitals reviewed.   Constitutional:       General: He is not in acute distress.     Appearance: Normal appearance. He is not toxic-appearing.   HENT:      Head: Normocephalic and atraumatic.      Right Ear: Tympanic membrane, ear canal and external ear normal.      Left Ear: Tympanic membrane, ear canal and external ear normal.      Nose: " Congestion present.      Right Sinus: Frontal sinus tenderness present.      Left Sinus: Frontal sinus tenderness present.      Mouth/Throat:      Lips: Pink.      Mouth: Mucous membranes are moist.      Pharynx: Oropharynx is clear. Uvula midline. Posterior oropharyngeal erythema present.   Eyes:      Extraocular Movements: Extraocular movements intact.      Conjunctiva/sclera: Conjunctivae normal.      Pupils: Pupils are equal, round, and reactive to light.   Cardiovascular:      Rate and Rhythm: Normal rate and regular rhythm.   Pulmonary:      Effort: Pulmonary effort is normal.      Breath sounds: Normal breath sounds.   Skin:     General: Skin is warm and dry.   Neurological:      General: No focal deficit present.      Mental Status: He is alert.                             Assessment & Plan        1. Sore throat  - POCT CEPHEID GROUP A STREP - PCR: NEGATIVE    2. Acute bacterial rhinosinusitis  - fluticasone (FLONASE) 50 MCG/ACT nasal spray; Administer 1 Spray into affected nostril(S) every day.  Dispense: 16 g; Refill: 0  - amoxicillin-clavulanate (AUGMENTIN) 875-125 MG Tab; Take 1 Tablet by mouth 2 times a day.  Dispense: 7 Tablet; Refill: 0    3. Postnasal drip  - fluticasone (FLONASE) 50 MCG/ACT nasal spray; Administer 1 Spray into affected nostril(S) every day.  Dispense: 16 g; Refill: 0    4. Essential hypertension  - Chronic, Treated/managed by PCP.  - BP in clinic is 128/80.  - Advised against use of decongestants due to essential hypertension. Coricidin can be used to treat symptoms.     Differential diagnoses, supportive care measures and indications for immediate follow-up discussed with patient. Pathogenesis of diagnosis discussed including typical length and natural progression.      Instructed to return to urgent care or nearest emergency department if symptoms fail to improve, for any change in condition, further concerns, or new concerning symptoms.    Patient states understanding and agrees  with the plan of care and discharge instructions.

## 2024-02-20 LAB
ALBUMIN SERPL-MCNC: 4.2 G/DL (ref 3.8–4.9)
ALBUMIN/GLOB SERPL: 1.9 {RATIO} (ref 1.2–2.2)
ALP SERPL-CCNC: 85 IU/L (ref 44–121)
ALT SERPL-CCNC: 18 IU/L (ref 0–44)
AST SERPL-CCNC: 14 IU/L (ref 0–40)
BASOPHILS # BLD AUTO: 0.1 X10E3/UL (ref 0–0.2)
BASOPHILS NFR BLD AUTO: 1 %
BILIRUB SERPL-MCNC: 0.6 MG/DL (ref 0–1.2)
BUN SERPL-MCNC: 15 MG/DL (ref 6–24)
BUN/CREAT SERPL: 14 (ref 9–20)
CALCIUM SERPL-MCNC: 9.5 MG/DL (ref 8.7–10.2)
CHLORIDE SERPL-SCNC: 108 MMOL/L (ref 96–106)
CO2 SERPL-SCNC: 22 MMOL/L (ref 20–29)
CREAT SERPL-MCNC: 1.09 MG/DL (ref 0.76–1.27)
EGFRCR SERPLBLD CKD-EPI 2021: 80 ML/MIN/1.73
EOSINOPHIL # BLD AUTO: 0.2 X10E3/UL (ref 0–0.4)
EOSINOPHIL NFR BLD AUTO: 2 %
ERYTHROCYTE [DISTWIDTH] IN BLOOD BY AUTOMATED COUNT: 11.7 % (ref 11.6–15.4)
GLOBULIN SER CALC-MCNC: 2.2 G/DL (ref 1.5–4.5)
GLUCOSE SERPL-MCNC: 102 MG/DL (ref 70–99)
HBA1C MFR BLD: 6.9 % (ref 4.8–5.6)
HCT VFR BLD AUTO: 47 % (ref 37.5–51)
HGB BLD-MCNC: 15.1 G/DL (ref 13–17.7)
IMM GRANULOCYTES # BLD AUTO: 0 X10E3/UL (ref 0–0.1)
IMM GRANULOCYTES NFR BLD AUTO: 0 %
IMMATURE CELLS  115398: ABNORMAL
LYMPHOCYTES # BLD AUTO: 4.2 X10E3/UL (ref 0.7–3.1)
LYMPHOCYTES NFR BLD AUTO: 42 %
MCH RBC QN AUTO: 29.1 PG (ref 26.6–33)
MCHC RBC AUTO-ENTMCNC: 32.1 G/DL (ref 31.5–35.7)
MCV RBC AUTO: 91 FL (ref 79–97)
MONOCYTES # BLD AUTO: 0.7 X10E3/UL (ref 0.1–0.9)
MONOCYTES NFR BLD AUTO: 7 %
MORPHOLOGY BLD-IMP: ABNORMAL
NEUTROPHILS # BLD AUTO: 4.9 X10E3/UL (ref 1.4–7)
NEUTROPHILS NFR BLD AUTO: 48 %
NRBC BLD AUTO-RTO: ABNORMAL %
PLATELET # BLD AUTO: 381 X10E3/UL (ref 150–450)
POTASSIUM SERPL-SCNC: 4.9 MMOL/L (ref 3.5–5.2)
PROT SERPL-MCNC: 6.4 G/DL (ref 6–8.5)
RBC # BLD AUTO: 5.19 X10E6/UL (ref 4.14–5.8)
SODIUM SERPL-SCNC: 142 MMOL/L (ref 134–144)
WBC # BLD AUTO: 10.1 X10E3/UL (ref 3.4–10.8)

## 2024-02-27 DIAGNOSIS — E11.9 CONTROLLED TYPE 2 DIABETES MELLITUS WITHOUT COMPLICATION, WITHOUT LONG-TERM CURRENT USE OF INSULIN (HCC): ICD-10-CM

## 2024-02-27 DIAGNOSIS — E78.00 PURE HYPERCHOLESTEROLEMIA: ICD-10-CM

## 2024-02-27 DIAGNOSIS — I10 ESSENTIAL HYPERTENSION: ICD-10-CM

## 2024-02-27 RX ORDER — ATORVASTATIN CALCIUM 20 MG/1
TABLET, FILM COATED ORAL
Qty: 90 TABLET | Refills: 1 | Status: SHIPPED | OUTPATIENT
Start: 2024-02-27

## 2024-02-27 RX ORDER — LOSARTAN POTASSIUM 50 MG/1
50 TABLET ORAL DAILY
Qty: 90 TABLET | Refills: 1 | Status: SHIPPED | OUTPATIENT
Start: 2024-02-27

## 2024-02-28 ENCOUNTER — OFFICE VISIT (OUTPATIENT)
Dept: MEDICAL GROUP | Facility: PHYSICIAN GROUP | Age: 59
End: 2024-02-28
Payer: COMMERCIAL

## 2024-02-28 VITALS
RESPIRATION RATE: 16 BRPM | TEMPERATURE: 97.7 F | HEIGHT: 69 IN | OXYGEN SATURATION: 98 % | SYSTOLIC BLOOD PRESSURE: 126 MMHG | BODY MASS INDEX: 30.36 KG/M2 | WEIGHT: 205 LBS | DIASTOLIC BLOOD PRESSURE: 82 MMHG | HEART RATE: 68 BPM

## 2024-02-28 DIAGNOSIS — Z11.59 NEED FOR HEPATITIS C SCREENING TEST: ICD-10-CM

## 2024-02-28 DIAGNOSIS — E11.9 CONTROLLED TYPE 2 DIABETES MELLITUS WITHOUT COMPLICATION, WITHOUT LONG-TERM CURRENT USE OF INSULIN (HCC): ICD-10-CM

## 2024-02-28 DIAGNOSIS — E55.9 VITAMIN D DEFICIENCY: ICD-10-CM

## 2024-02-28 DIAGNOSIS — Z00.00 WELLNESS EXAMINATION: ICD-10-CM

## 2024-02-28 DIAGNOSIS — E78.00 PURE HYPERCHOLESTEROLEMIA: ICD-10-CM

## 2024-02-28 PROCEDURE — 3079F DIAST BP 80-89 MM HG: CPT | Performed by: FAMILY MEDICINE

## 2024-02-28 PROCEDURE — 3074F SYST BP LT 130 MM HG: CPT | Performed by: FAMILY MEDICINE

## 2024-02-28 PROCEDURE — 99214 OFFICE O/P EST MOD 30 MIN: CPT | Performed by: FAMILY MEDICINE

## 2024-02-28 ASSESSMENT — PATIENT HEALTH QUESTIONNAIRE - PHQ9: CLINICAL INTERPRETATION OF PHQ2 SCORE: 0

## 2024-02-28 ASSESSMENT — FIBROSIS 4 INDEX: FIB4 SCORE: 0.49

## 2024-02-28 NOTE — PROGRESS NOTES
"Subjective:     CC:   Chief Complaint   Patient presents with    Follow-Up    Lab Results       HPI:   Victor Hugo presents today for follow-up of his labs.  Patient denies any issues at this time and he feels like he is doing well.  I did fax this prescription to his mail order yesterday.    Past Medical History:   Diagnosis Date    Allergy     Cancer (HCC) 2019    skin    Diabetes ([facility])     oral meds    High cholesterol     Hypertension     Psychiatric problem     depression       Social History     Tobacco Use    Smoking status: Never    Smokeless tobacco: Never   Vaping Use    Vaping Use: Never used   Substance Use Topics    Alcohol use: Not Currently     Comment: 2/week    Drug use: Never       Current Outpatient Medications Ordered in Epic   Medication Sig Dispense Refill    metFORMIN (GLUCOPHAGE) 500 MG Tab TAKE 1 TABLET BY MOUTH DAILY 90 Tablet 1    losartan (COZAAR) 50 MG Tab TAKE 1 TABLET BY MOUTH DAILY 90 Tablet 1    atorvastatin (LIPITOR) 20 MG Tab TAKE 1 TABLET BY MOUTH DAILY  EVERY NIGHT AT BEDTIME 90 Tablet 1    fluticasone (FLONASE) 50 MCG/ACT nasal spray Administer 1 Spray into affected nostril(S) every day. 16 g 0    amoxicillin-clavulanate (AUGMENTIN) 875-125 MG Tab Take 1 Tablet by mouth 2 times a day. 7 Tablet 0     No current Epic-ordered facility-administered medications on file.       Allergies:  Patient has no known allergies.    Health Maintenance: Completed    ROS:  Gen: no fevers/chills, patient has gained 5 pounds since November  Eyes: no changes in vision  ENT: no sore throat, no hearing loss, no bloody nose  Pulm: no sob, no cough  CV: no chest pain, no palpitations  GI: no nausea/vomiting, no diarrhea  : no dysuria  Neuro: no headaches, no numbness/tingling  Heme/Lymph: no easy bruising    Objective:     Exam:  /82 (BP Location: Right arm, Patient Position: Sitting, BP Cuff Size: Adult)   Pulse 68   Temp 36.5 °C (97.7 °F) (Temporal)   Resp 16   Ht 1.753 m (5' 9\")   Wt 93 kg " (205 lb)   SpO2 98%   BMI 30.27 kg/m²  Body mass index is 30.27 kg/m².    Gen: Alert and oriented, No apparent distress.  Skin: Warm and dry.  No obvious lesions.  Eyes: Sclera wnl Pupils normal in size  Lungs: Normal effort, CTA bilaterally, no wheezes, rhonchi, or rales  CV: Regular rate and rhythm. No murmurs, rubs, or gallops.  Musculoskeletal: Normal gait. No extremity cyanosis, clubbing, or edema.  Neuro: Oriented to person, place and time  Psych: Mood is wnl       Assessment & Plan:     58 y.o. male with the following -     1. Controlled type 2 diabetes mellitus without complication, without long-term current use of insulin (HCC)  Patient's hemoglobin A1c is 6.9.  I would recommend repeating this in June timeframe patient very agreeable with the plan.  - CBC WITH DIFFERENTIAL; Future  - Comp Metabolic Panel; Future  - MICROALB/CREAT RATIO RAND. UR  - HEMOGLOBIN A1C; Future    2. Pure hypercholesterolemia  Patient will be due to recheck his lipid panel in June we will preordered this patient given the lab order since he does use [facility]  - Lipid Profile; Future    3. Vitamin D deficiency  Recommend checking his vitamin D patient will get this done when he gets his other lab work done prior to seeing me back in June  - VITAMIN D,25 HYDROXY (DEFICIENCY); Future    4. Wellness examination  - CBC WITH DIFFERENTIAL; Future    5. Need for hepatitis C screening test  - HEP C VIRUS ANTIBODY; Future       Return in about 4 months (around 6/28/2024), or if symptoms worsen or fail to improve.    Please note that this dictation was created using voice recognition software. I have made every reasonable attempt to correct obvious errors, but I expect that there are errors of grammar and possibly content that I did not discover before finalizing the note.

## 2024-06-01 LAB
ALBUMIN SERPL-MCNC: 4.4 G/DL (ref 3.8–4.9)
ALBUMIN/CREAT UR: <4 MG/G CREAT (ref 0–29)
ALBUMIN/GLOB SERPL: 1.9 {RATIO} (ref 1.2–2.2)
ALP SERPL-CCNC: 90 IU/L (ref 44–121)
ALT SERPL-CCNC: 25 IU/L (ref 0–44)
AST SERPL-CCNC: 19 IU/L (ref 0–40)
BASOPHILS # BLD AUTO: 0.1 X10E3/UL (ref 0–0.2)
BASOPHILS NFR BLD AUTO: 1 %
BILIRUB SERPL-MCNC: 0.4 MG/DL (ref 0–1.2)
BUN SERPL-MCNC: 23 MG/DL (ref 6–24)
BUN/CREAT SERPL: 23 (ref 9–20)
CALCIUM SERPL-MCNC: 9.7 MG/DL (ref 8.7–10.2)
CHLORIDE SERPL-SCNC: 108 MMOL/L (ref 96–106)
CO2 SERPL-SCNC: 18 MMOL/L (ref 20–29)
CREAT SERPL-MCNC: 0.98 MG/DL (ref 0.76–1.27)
CREAT UR-MCNC: 73.9 MG/DL
EGFRCR SERPLBLD CKD-EPI 2021: 91 ML/MIN/1.73
EOSINOPHIL # BLD AUTO: 0.1 X10E3/UL (ref 0–0.4)
EOSINOPHIL NFR BLD AUTO: 2 %
ERYTHROCYTE [DISTWIDTH] IN BLOOD BY AUTOMATED COUNT: 12.2 % (ref 11.6–15.4)
GLOBULIN SER CALC-MCNC: 2.3 G/DL (ref 1.5–4.5)
GLUCOSE SERPL-MCNC: 113 MG/DL (ref 70–99)
HBA1C MFR BLD: 6.7 % (ref 4.8–5.6)
HCT VFR BLD AUTO: 45.3 % (ref 37.5–51)
HCV IGG SERPL QL IA: NON REACTIVE
HGB BLD-MCNC: 14.8 G/DL (ref 13–17.7)
IMM GRANULOCYTES # BLD AUTO: 0 X10E3/UL (ref 0–0.1)
IMM GRANULOCYTES NFR BLD AUTO: 0 %
IMMATURE CELLS  115398: ABNORMAL
LYMPHOCYTES # BLD AUTO: 3.4 X10E3/UL (ref 0.7–3.1)
LYMPHOCYTES NFR BLD AUTO: 38 %
MCH RBC QN AUTO: 30 PG (ref 26.6–33)
MCHC RBC AUTO-ENTMCNC: 32.7 G/DL (ref 31.5–35.7)
MCV RBC AUTO: 92 FL (ref 79–97)
MICROALBUMIN UR-MCNC: <3 UG/ML
MONOCYTES # BLD AUTO: 0.8 X10E3/UL (ref 0.1–0.9)
MONOCYTES NFR BLD AUTO: 9 %
MORPHOLOGY BLD-IMP: ABNORMAL
NEUTROPHILS # BLD AUTO: 4.6 X10E3/UL (ref 1.4–7)
NEUTROPHILS NFR BLD AUTO: 50 %
NRBC BLD AUTO-RTO: ABNORMAL %
PLATELET # BLD AUTO: 332 X10E3/UL (ref 150–450)
POTASSIUM SERPL-SCNC: 4.8 MMOL/L (ref 3.5–5.2)
PROT SERPL-MCNC: 6.7 G/DL (ref 6–8.5)
RBC # BLD AUTO: 4.94 X10E6/UL (ref 4.14–5.8)
SODIUM SERPL-SCNC: 141 MMOL/L (ref 134–144)
WBC # BLD AUTO: 9.1 X10E3/UL (ref 3.4–10.8)

## 2024-06-10 ENCOUNTER — OFFICE VISIT (OUTPATIENT)
Dept: MEDICAL GROUP | Facility: PHYSICIAN GROUP | Age: 59
End: 2024-06-10
Payer: COMMERCIAL

## 2024-06-10 VITALS
RESPIRATION RATE: 16 BRPM | BODY MASS INDEX: 30.24 KG/M2 | WEIGHT: 204.2 LBS | SYSTOLIC BLOOD PRESSURE: 128 MMHG | HEART RATE: 85 BPM | TEMPERATURE: 97.4 F | DIASTOLIC BLOOD PRESSURE: 74 MMHG | OXYGEN SATURATION: 96 % | HEIGHT: 69 IN

## 2024-06-10 DIAGNOSIS — E11.9 CONTROLLED TYPE 2 DIABETES MELLITUS WITHOUT COMPLICATION, WITHOUT LONG-TERM CURRENT USE OF INSULIN (HCC): ICD-10-CM

## 2024-06-10 DIAGNOSIS — R79.89 ABNORMAL CBC: ICD-10-CM

## 2024-06-10 DIAGNOSIS — E78.00 PURE HYPERCHOLESTEROLEMIA: ICD-10-CM

## 2024-06-10 DIAGNOSIS — I10 ESSENTIAL HYPERTENSION: ICD-10-CM

## 2024-06-10 PROCEDURE — 3074F SYST BP LT 130 MM HG: CPT | Performed by: FAMILY MEDICINE

## 2024-06-10 PROCEDURE — 3078F DIAST BP <80 MM HG: CPT | Performed by: FAMILY MEDICINE

## 2024-06-10 PROCEDURE — 99214 OFFICE O/P EST MOD 30 MIN: CPT | Performed by: FAMILY MEDICINE

## 2024-06-10 RX ORDER — TRIAMCINOLONE ACETONIDE 1 MG/G
CREAM TOPICAL
COMMUNITY
Start: 2024-05-13

## 2024-06-10 RX ORDER — LOSARTAN POTASSIUM 50 MG/1
50 TABLET ORAL DAILY
Qty: 90 TABLET | Refills: 2 | Status: SHIPPED | OUTPATIENT
Start: 2024-06-10

## 2024-06-10 ASSESSMENT — FIBROSIS 4 INDEX: FIB4 SCORE: 0.64

## 2024-06-10 NOTE — PROGRESS NOTES
"Subjective:     CC:   Chief Complaint   Patient presents with    Follow-Up       HPI:   Victor Hugo presents today for follow-up of his labs.  Patient feels he is doing well.    Past Medical History:   Diagnosis Date    Allergy     Cancer ([facility]) 2019    skin    Diabetes ([facility])     oral meds    High cholesterol     Hypertension     Psychiatric problem     depression       Social History     Tobacco Use    Smoking status: Never    Smokeless tobacco: Never   Vaping Use    Vaping status: Never Used   Substance Use Topics    Alcohol use: Not Currently     Comment: 2/week    Drug use: Never       Current Outpatient Medications Ordered in Epic   Medication Sig Dispense Refill    triamcinolone acetonide (KENALOG) 0.1 % Cream APPLY TWICE DAILY TO ECZEMA AREA ON BACK AXILLA UNTIL ECZEMA RESOLVED.      losartan (COZAAR) 50 MG Tab Take 1 Tablet by mouth every day. 90 Tablet 2    metFORMIN (GLUCOPHAGE) 500 MG Tab Take 1 Tablet by mouth every day. 90 Tablet 2    atorvastatin (LIPITOR) 20 MG Tab TAKE 1 TABLET BY MOUTH DAILY  EVERY NIGHT AT BEDTIME 90 Tablet 1     No current Epic-ordered facility-administered medications on file.       Allergies:  Patient has no known allergies.    Health Maintenance: Completed    ROS:  Gen: no fevers/chills, no changes in weight  Eyes: no changes in vision  ENT: no sore throat, no hearing loss, no bloody nose  Pulm: no sob, no cough  CV: no chest pain, no palpitations  GI: no nausea/vomiting, no diarrhea  : no dysuria  Neuro: no headaches, no numbness/tingling  Heme/Lymph: no easy bruising    Objective:     Exam:  /74 (BP Location: Right arm, Patient Position: Sitting, BP Cuff Size: Adult)   Pulse 85   Temp 36.3 °C (97.4 °F) (Temporal)   Resp 16   Ht 1.753 m (5' 9\")   Wt 92.6 kg (204 lb 3.2 oz)   SpO2 96%   BMI 30.16 kg/m²  Body mass index is 30.16 kg/m².    Gen: Alert and oriented, No apparent distress.  Skin: Warm and dry.  No obvious lesions.  Monofilament exam was done patient able to " feel equally in both feet with no problems  Eyes: Sclera wnl Pupils normal in size  Lungs: Normal effort, CTA bilaterally, no wheezes, rhonchi, or rales  CV: Regular rate and rhythm. No murmurs, rubs, or gallops.  Musculoskeletal: Normal gait. No extremity cyanosis, clubbing, or edema.  Neuro: Oriented to person, place and time  Psych: Mood is wnl       Assessment & Plan:     58 y.o. male with the following -     1. Essential hypertension  Blood pressure looks at goal continue present medication we will see him back in October  - losartan (COZAAR) 50 MG Tab; Take 1 Tablet by mouth every day.  Dispense: 90 Tablet; Refill: 2    2. Controlled type 2 diabetes mellitus without complication, without long-term current use of insulin (HCC)  Patient's hemoglobin A1c is slightly elevated patient to continue to work on his diet we will see him back in October  - metFORMIN (GLUCOPHAGE) 500 MG Tab; Take 1 Tablet by mouth every day.  Dispense: 90 Tablet; Refill: 2  - Comp Metabolic Panel; Future  - HEMOGLOBIN A1C; Future    3. Pure hypercholesterolemia  Recommend repeating his lipid panel again in October  - Comp Metabolic Panel; Future  - Lipid Profile; Future    4. Abnormal CBC  CBC was slightly abnormal with slight increase in lymphocytes we will repeat this again in October  - CBC WITH DIFFERENTIAL; Future           Return in about 4 months (around 10/10/2024), or if symptoms worsen or fail to improve.    Please note that this dictation was created using voice recognition software. I have made every reasonable attempt to correct obvious errors, but I expect that there are errors of grammar and possibly content that I did not discover before finalizing the note.

## 2024-07-17 DIAGNOSIS — E11.9 CONTROLLED TYPE 2 DIABETES MELLITUS WITHOUT COMPLICATION, WITHOUT LONG-TERM CURRENT USE OF INSULIN (HCC): ICD-10-CM

## 2024-07-17 DIAGNOSIS — I10 ESSENTIAL HYPERTENSION: ICD-10-CM

## 2024-07-18 RX ORDER — LOSARTAN POTASSIUM 50 MG/1
50 TABLET ORAL DAILY
Qty: 90 TABLET | Refills: 1 | Status: SHIPPED | OUTPATIENT
Start: 2024-07-18

## 2024-08-29 DIAGNOSIS — E78.00 PURE HYPERCHOLESTEROLEMIA: ICD-10-CM

## 2024-08-29 RX ORDER — ATORVASTATIN CALCIUM 20 MG/1
TABLET, FILM COATED ORAL
Qty: 90 TABLET | Refills: 0 | Status: SHIPPED | OUTPATIENT
Start: 2024-08-29

## 2024-10-01 LAB
ALBUMIN SERPL-MCNC: 4 G/DL (ref 3.8–4.9)
ALP SERPL-CCNC: 89 IU/L (ref 44–121)
ALT SERPL-CCNC: 16 IU/L (ref 0–44)
AST SERPL-CCNC: 19 IU/L (ref 0–40)
BASOPHILS # BLD AUTO: 0.1 X10E3/UL (ref 0–0.2)
BASOPHILS NFR BLD AUTO: 1 %
BILIRUB SERPL-MCNC: 0.5 MG/DL (ref 0–1.2)
BUN SERPL-MCNC: 16 MG/DL (ref 6–24)
BUN/CREAT SERPL: 17 (ref 9–20)
CALCIUM SERPL-MCNC: 9 MG/DL (ref 8.7–10.2)
CHLORIDE SERPL-SCNC: 109 MMOL/L (ref 96–106)
CHOLEST SERPL-MCNC: 138 MG/DL (ref 100–199)
CO2 SERPL-SCNC: 20 MMOL/L (ref 20–29)
CREAT SERPL-MCNC: 0.93 MG/DL (ref 0.76–1.27)
EGFRCR SERPLBLD CKD-EPI 2021: 96 ML/MIN/1.73
EOSINOPHIL # BLD AUTO: 0.2 X10E3/UL (ref 0–0.4)
EOSINOPHIL NFR BLD AUTO: 2 %
ERYTHROCYTE [DISTWIDTH] IN BLOOD BY AUTOMATED COUNT: 11.8 % (ref 11.6–15.4)
GLOBULIN SER CALC-MCNC: 2.1 G/DL (ref 1.5–4.5)
GLUCOSE SERPL-MCNC: 110 MG/DL (ref 70–99)
HBA1C MFR BLD: 6.8 % (ref 4.8–5.6)
HCT VFR BLD AUTO: 44.8 % (ref 37.5–51)
HDLC SERPL-MCNC: 40 MG/DL
HGB BLD-MCNC: 15.2 G/DL (ref 13–17.7)
IMM GRANULOCYTES # BLD AUTO: 0 X10E3/UL (ref 0–0.1)
IMM GRANULOCYTES NFR BLD AUTO: 0 %
IMMATURE CELLS  115398: ABNORMAL
LDL CALC COMMENT:: NORMAL
LDLC SERPL CALC-MCNC: 76 MG/DL (ref 0–99)
LYMPHOCYTES # BLD AUTO: 3.7 X10E3/UL (ref 0.7–3.1)
LYMPHOCYTES NFR BLD AUTO: 39 %
MCH RBC QN AUTO: 30.9 PG (ref 26.6–33)
MCHC RBC AUTO-ENTMCNC: 33.9 G/DL (ref 31.5–35.7)
MCV RBC AUTO: 91 FL (ref 79–97)
MONOCYTES # BLD AUTO: 0.7 X10E3/UL (ref 0.1–0.9)
MONOCYTES NFR BLD AUTO: 8 %
MORPHOLOGY BLD-IMP: ABNORMAL
NEUTROPHILS # BLD AUTO: 4.7 X10E3/UL (ref 1.4–7)
NEUTROPHILS NFR BLD AUTO: 50 %
NRBC BLD AUTO-RTO: ABNORMAL %
PLATELET # BLD AUTO: 323 X10E3/UL (ref 150–450)
POTASSIUM SERPL-SCNC: 4.8 MMOL/L (ref 3.5–5.2)
PROT SERPL-MCNC: 6.1 G/DL (ref 6–8.5)
RBC # BLD AUTO: 4.92 X10E6/UL (ref 4.14–5.8)
SODIUM SERPL-SCNC: 141 MMOL/L (ref 134–144)
TRIGL SERPL-MCNC: 122 MG/DL (ref 0–149)
VLDLC SERPL CALC-MCNC: 22 MG/DL (ref 5–40)
WBC # BLD AUTO: 9.3 X10E3/UL (ref 3.4–10.8)

## 2024-10-09 ENCOUNTER — OFFICE VISIT (OUTPATIENT)
Dept: MEDICAL GROUP | Facility: PHYSICIAN GROUP | Age: 59
End: 2024-10-09
Payer: COMMERCIAL

## 2024-10-09 VITALS
TEMPERATURE: 96.8 F | RESPIRATION RATE: 16 BRPM | WEIGHT: 204.4 LBS | SYSTOLIC BLOOD PRESSURE: 118 MMHG | HEART RATE: 84 BPM | DIASTOLIC BLOOD PRESSURE: 76 MMHG | BODY MASS INDEX: 30.27 KG/M2 | OXYGEN SATURATION: 97 % | HEIGHT: 69 IN

## 2024-10-09 DIAGNOSIS — I10 ESSENTIAL HYPERTENSION: ICD-10-CM

## 2024-10-09 DIAGNOSIS — E55.9 VITAMIN D DEFICIENCY: ICD-10-CM

## 2024-10-09 DIAGNOSIS — E78.00 PURE HYPERCHOLESTEROLEMIA: ICD-10-CM

## 2024-10-09 DIAGNOSIS — E11.9 CONTROLLED TYPE 2 DIABETES MELLITUS WITHOUT COMPLICATION, WITHOUT LONG-TERM CURRENT USE OF INSULIN (HCC): ICD-10-CM

## 2024-10-09 DIAGNOSIS — Z23 NEED FOR VACCINATION: ICD-10-CM

## 2024-10-09 DIAGNOSIS — Z12.5 SCREENING FOR PROSTATE CANCER: ICD-10-CM

## 2024-10-09 PROCEDURE — 3078F DIAST BP <80 MM HG: CPT | Performed by: FAMILY MEDICINE

## 2024-10-09 PROCEDURE — 90471 IMMUNIZATION ADMIN: CPT | Performed by: FAMILY MEDICINE

## 2024-10-09 PROCEDURE — 3074F SYST BP LT 130 MM HG: CPT | Performed by: FAMILY MEDICINE

## 2024-10-09 PROCEDURE — 90472 IMMUNIZATION ADMIN EACH ADD: CPT | Performed by: FAMILY MEDICINE

## 2024-10-09 PROCEDURE — 99214 OFFICE O/P EST MOD 30 MIN: CPT | Mod: 25 | Performed by: FAMILY MEDICINE

## 2024-10-09 PROCEDURE — 90656 IIV3 VACC NO PRSV 0.5 ML IM: CPT | Performed by: FAMILY MEDICINE

## 2024-10-09 PROCEDURE — 90677 PCV20 VACCINE IM: CPT | Performed by: FAMILY MEDICINE

## 2024-10-09 RX ORDER — ATORVASTATIN CALCIUM 20 MG/1
20 TABLET, FILM COATED ORAL DAILY
Qty: 90 TABLET | Refills: 3 | Status: SHIPPED | OUTPATIENT
Start: 2024-10-09 | End: 2025-10-04

## 2024-10-09 RX ORDER — LOSARTAN POTASSIUM 50 MG/1
50 TABLET ORAL DAILY
Qty: 90 TABLET | Refills: 1 | Status: SHIPPED | OUTPATIENT
Start: 2024-10-09

## 2024-10-09 ASSESSMENT — FIBROSIS 4 INDEX: FIB4 SCORE: 0.84

## 2025-04-15 LAB
25(OH)D3+25(OH)D2 SERPL-MCNC: 76.2 NG/ML (ref 30–100)
ALBUMIN SERPL-MCNC: 4.1 G/DL (ref 3.8–4.9)
ALP SERPL-CCNC: 86 IU/L (ref 44–121)
ALT SERPL-CCNC: 20 IU/L (ref 0–44)
AST SERPL-CCNC: 17 IU/L (ref 0–40)
BASOPHILS # BLD AUTO: 0.1 X10E3/UL (ref 0–0.2)
BASOPHILS NFR BLD AUTO: 1 %
BILIRUB SERPL-MCNC: 0.6 MG/DL (ref 0–1.2)
BUN SERPL-MCNC: 20 MG/DL (ref 6–24)
BUN/CREAT SERPL: 19 (ref 9–20)
CALCIUM SERPL-MCNC: 9.5 MG/DL (ref 8.7–10.2)
CHLORIDE SERPL-SCNC: 109 MMOL/L (ref 96–106)
CO2 SERPL-SCNC: 19 MMOL/L (ref 20–29)
CREAT SERPL-MCNC: 1.05 MG/DL (ref 0.76–1.27)
EGFRCR SERPLBLD CKD-EPI 2021: 83 ML/MIN/1.73
EOSINOPHIL # BLD AUTO: 0.1 X10E3/UL (ref 0–0.4)
EOSINOPHIL NFR BLD AUTO: 1 %
ERYTHROCYTE [DISTWIDTH] IN BLOOD BY AUTOMATED COUNT: 11.6 % (ref 11.6–15.4)
GLOBULIN SER CALC-MCNC: 2.3 G/DL (ref 1.5–4.5)
GLUCOSE SERPL-MCNC: 117 MG/DL (ref 70–99)
HBA1C MFR BLD: 6.7 % (ref 4.8–5.6)
HCT VFR BLD AUTO: 45.1 % (ref 37.5–51)
HGB BLD-MCNC: 15 G/DL (ref 13–17.7)
IMM GRANULOCYTES # BLD AUTO: 0 X10E3/UL (ref 0–0.1)
IMM GRANULOCYTES NFR BLD AUTO: 0 %
IMMATURE CELLS  115398: ABNORMAL
LYMPHOCYTES # BLD AUTO: 3.6 X10E3/UL (ref 0.7–3.1)
LYMPHOCYTES NFR BLD AUTO: 38 %
MCH RBC QN AUTO: 30.1 PG (ref 26.6–33)
MCHC RBC AUTO-ENTMCNC: 33.3 G/DL (ref 31.5–35.7)
MCV RBC AUTO: 91 FL (ref 79–97)
MONOCYTES # BLD AUTO: 0.7 X10E3/UL (ref 0.1–0.9)
MONOCYTES NFR BLD AUTO: 7 %
MORPHOLOGY BLD-IMP: ABNORMAL
NEUTROPHILS # BLD AUTO: 5 X10E3/UL (ref 1.4–7)
NEUTROPHILS NFR BLD AUTO: 53 %
NRBC BLD AUTO-RTO: ABNORMAL %
PLATELET # BLD AUTO: 345 X10E3/UL (ref 150–450)
POTASSIUM SERPL-SCNC: 4.7 MMOL/L (ref 3.5–5.2)
PROT SERPL-MCNC: 6.4 G/DL (ref 6–8.5)
PSA SERPL-MCNC: 0.4 NG/ML (ref 0–4)
RBC # BLD AUTO: 4.98 X10E6/UL (ref 4.14–5.8)
SODIUM SERPL-SCNC: 139 MMOL/L (ref 134–144)
WBC # BLD AUTO: 9.6 X10E3/UL (ref 3.4–10.8)

## 2025-04-16 ENCOUNTER — OFFICE VISIT (OUTPATIENT)
Dept: MEDICAL GROUP | Facility: PHYSICIAN GROUP | Age: 60
End: 2025-04-16
Payer: COMMERCIAL

## 2025-04-16 VITALS
RESPIRATION RATE: 16 BRPM | OXYGEN SATURATION: 97 % | DIASTOLIC BLOOD PRESSURE: 62 MMHG | WEIGHT: 202.3 LBS | HEART RATE: 88 BPM | TEMPERATURE: 96.6 F | SYSTOLIC BLOOD PRESSURE: 108 MMHG | HEIGHT: 69 IN | BODY MASS INDEX: 29.96 KG/M2

## 2025-04-16 DIAGNOSIS — E78.00 PURE HYPERCHOLESTEROLEMIA: ICD-10-CM

## 2025-04-16 DIAGNOSIS — E11.9 CONTROLLED TYPE 2 DIABETES MELLITUS WITHOUT COMPLICATION, WITHOUT LONG-TERM CURRENT USE OF INSULIN (HCC): ICD-10-CM

## 2025-04-16 DIAGNOSIS — I10 ESSENTIAL HYPERTENSION: ICD-10-CM

## 2025-04-16 DIAGNOSIS — R53.83 OTHER FATIGUE: ICD-10-CM

## 2025-04-16 DIAGNOSIS — E55.9 VITAMIN D DEFICIENCY: ICD-10-CM

## 2025-04-16 PROCEDURE — 3074F SYST BP LT 130 MM HG: CPT | Performed by: FAMILY MEDICINE

## 2025-04-16 PROCEDURE — 99214 OFFICE O/P EST MOD 30 MIN: CPT | Performed by: FAMILY MEDICINE

## 2025-04-16 PROCEDURE — 3078F DIAST BP <80 MM HG: CPT | Performed by: FAMILY MEDICINE

## 2025-04-16 RX ORDER — ALPRAZOLAM 0.5 MG
TABLET ORAL
COMMUNITY
Start: 2025-02-07

## 2025-04-16 RX ORDER — LOSARTAN POTASSIUM 50 MG/1
50 TABLET ORAL DAILY
Qty: 90 TABLET | Refills: 1 | Status: SHIPPED | OUTPATIENT
Start: 2025-04-16

## 2025-04-16 ASSESSMENT — PATIENT HEALTH QUESTIONNAIRE - PHQ9
5. POOR APPETITE OR OVEREATING: NOT AT ALL
SUM OF ALL RESPONSES TO PHQ QUESTIONS 1-9: 0
4. FEELING TIRED OR HAVING LITTLE ENERGY: NOT AT ALL
9. THOUGHTS THAT YOU WOULD BE BETTER OFF DEAD, OR OF HURTING YOURSELF: NOT AT ALL
7. TROUBLE CONCENTRATING ON THINGS, SUCH AS READING THE NEWSPAPER OR WATCHING TELEVISION: NOT AT ALL
3. TROUBLE FALLING OR STAYING ASLEEP OR SLEEPING TOO MUCH: NOT AT ALL
2. FEELING DOWN, DEPRESSED, IRRITABLE, OR HOPELESS: NOT AT ALL
SUM OF ALL RESPONSES TO PHQ9 QUESTIONS 1 AND 2: 0
8. MOVING OR SPEAKING SO SLOWLY THAT OTHER PEOPLE COULD HAVE NOTICED. OR THE OPPOSITE, BEING SO FIGETY OR RESTLESS THAT YOU HAVE BEEN MOVING AROUND A LOT MORE THAN USUAL: NOT AT ALL
1. LITTLE INTEREST OR PLEASURE IN DOING THINGS: NOT AT ALL
6. FEELING BAD ABOUT YOURSELF - OR THAT YOU ARE A FAILURE OR HAVE LET YOURSELF OR YOUR FAMILY DOWN: NOT AL ALL

## 2025-04-16 ASSESSMENT — FIBROSIS 4 INDEX: FIB4 SCORE: 0.63

## 2025-04-16 NOTE — PROGRESS NOTES
Subjective:     CC:   Chief Complaint   Patient presents with    Follow-Up       HPI:   Victor Hugo presents today for follow-up.  Patient normally only wants to see me every 6 months.  Patient does go to external lab to get his lab work done.  Patient states he is doing well just feeling occasionally tired at times.  Patient denies feeling depressed.    Past Medical History:   Diagnosis Date    Allergy     Cancer (HCC) 2019    skin    Diabetes ([facility])     oral meds    High cholesterol     Hypertension     Psychiatric problem     depression       Social History     Tobacco Use    Smoking status: Never    Smokeless tobacco: Never   Vaping Use    Vaping status: Never Used   Substance Use Topics    Alcohol use: Not Currently     Comment: 2/week    Drug use: Never       Current Outpatient Medications Ordered in Epic   Medication Sig Dispense Refill    ALPRAZolam (XANAX) 0.5 MG Tab TAKE 1 TABLET 1 HOUR PRIOR TO MRI. YOU MAY TAKE 1 MORE TABLET JUST BEFORE MRI.      losartan (COZAAR) 50 MG Tab Take 1 Tablet by mouth every day. 90 Tablet 1    metFORMIN (GLUCOPHAGE) 500 MG Tab Take 1 Tablet by mouth every day. 90 Tablet 1    atorvastatin (LIPITOR) 20 MG Tab Take 1 Tablet by mouth every day for 360 days. 90 Tablet 3    triamcinolone acetonide (KENALOG) 0.1 % Cream APPLY TWICE DAILY TO ECZEMA AREA ON BACK AXILLA UNTIL ECZEMA RESOLVED.       No current Epic-ordered facility-administered medications on file.       Allergies:  Patient has no known allergies.    Health Maintenance: Completed    ROS:  Gen: no fevers/chills, has lost a couple of pounds  Eyes: no changes in vision  ENT: no sore throat, no hearing loss, no bloody nose  Pulm: no sob, no cough  CV: no chest pain, no palpitations  GI: no nausea/vomiting, no diarrhea  : no dysuria  Neuro: no headaches, no numbness/tingling  Heme/Lymph: no easy bruising    Objective:     Exam:  /62 (BP Location: Left arm, Patient Position: Sitting, BP Cuff Size: Adult)   Pulse 88    "Temp 35.9 °C (96.6 °F)   Resp 16   Ht 1.753 m (5' 9\")   Wt 91.8 kg (202 lb 4.8 oz)   SpO2 97%   BMI 29.87 kg/m²  Body mass index is 29.87 kg/m².    Gen: Alert and oriented, No apparent distress.  Skin: Warm and dry.  No obvious lesions.  Eyes: Sclera wnl Pupils normal in size  Lungs: Normal effort, CTA bilaterally, no wheezes, rhonchi, or rales  CV: Regular rate and rhythm. No murmurs, rubs, or gallops.  Musculoskeletal: Normal gait. No extremity cyanosis, clubbing, or edema.  Neuro: Oriented to person, place and time  Psych: Mood is wnl       Assessment & Plan:     59 y.o. male with the following -     1. Controlled type 2 diabetes mellitus without complication, without long-term current use of insulin (HCC)  Patient's hemoglobin A1c is 6.7 would recommend repeating this again in another 3 to 4 months patient very agreeable with the plan.  Patient to continue the metformin at this time  - Comp Metabolic Panel; Future  - HEMOGLOBIN A1C; Future  - MICROALB/CREAT RATIO RAND. UR    2. Pure hypercholesterolemia  Recommend repeating the lipid panel  - Comp Metabolic Panel; Future  - Lipid Profile; Future    3. Essential hypertension  Blood pressure is at goal continue present medication    4. Other fatigue  And is feeling slightly tired will check his CBC and thyroid patient wants to follow-up in 6 months warned patient if symptoms persist to see me back sooner  - CBC WITH DIFFERENTIAL; Future  - TSH; Future    5. Vitamin D deficiency  - VITAMIN D,25 HYDROXY (DEFICIENCY); Future    Other orders  - ALPRAZolam (XANAX) 0.5 MG Tab; TAKE 1 TABLET 1 HOUR PRIOR TO MRI. YOU MAY TAKE 1 MORE TABLET JUST BEFORE MRI.       Return in about 6 months (around 10/16/2025), or if symptoms worsen or fail to improve.    Please note that this dictation was created using voice recognition software. I have made every reasonable attempt to correct obvious errors, but I expect that there are errors of grammar and possibly content that I did " not discover before finalizing the note.

## 2025-06-09 ENCOUNTER — APPOINTMENT (OUTPATIENT)
Dept: URBAN - METROPOLITAN AREA CLINIC 36 | Facility: CLINIC | Age: 60
Setting detail: DERMATOLOGY
End: 2025-06-09

## 2025-06-09 PROCEDURE — ? MOHS SURGERY

## 2025-06-09 PROCEDURE — ? REFERRAL CORRESPONDENCE

## 2025-06-27 DIAGNOSIS — E78.00 PURE HYPERCHOLESTEROLEMIA: ICD-10-CM

## 2025-06-30 RX ORDER — ATORVASTATIN CALCIUM 20 MG/1
TABLET, FILM COATED ORAL
Qty: 90 TABLET | Refills: 0 | Status: SHIPPED | OUTPATIENT
Start: 2025-06-30

## 2025-07-30 DIAGNOSIS — E11.9 CONTROLLED TYPE 2 DIABETES MELLITUS WITHOUT COMPLICATION, WITHOUT LONG-TERM CURRENT USE OF INSULIN (HCC): ICD-10-CM

## 2025-07-31 NOTE — TELEPHONE ENCOUNTER
Received request via: Pharmacy    Was the patient seen in the last year in this department? Yes    Does the patient have an active prescription (recently filled or refills available) for medication(s) requested? No    Pharmacy Name:     [facility] - [address] (Pharmacy) [phone]       Does the patient have half-way Plus and need 100-day supply? (This applies to ALL medications) Patient does not have SCP

## 2025-08-14 ENCOUNTER — APPOINTMENT (OUTPATIENT)
Dept: URBAN - METROPOLITAN AREA CLINIC 36 | Facility: CLINIC | Age: 60
Setting detail: DERMATOLOGY
End: 2025-08-14

## 2025-08-14 DIAGNOSIS — L57.0 ACTINIC KERATOSIS: ICD-10-CM

## 2025-08-14 PROBLEM — C44.319 BASAL CELL CARCINOMA OF SKIN OF OTHER PARTS OF FACE: Status: ACTIVE | Noted: 2025-08-14

## 2025-08-14 PROCEDURE — ? MEDICATION COUNSELING

## 2025-08-14 PROCEDURE — ? COUNSELING

## 2025-08-14 PROCEDURE — ? PRESCRIPTION

## 2025-08-14 PROCEDURE — ? REFERRAL CORRESPONDENCE

## 2025-08-14 PROCEDURE — ? MOHS SURGERY

## 2025-08-14 PROCEDURE — ? DIAGNOSIS COMMENT

## 2025-08-14 RX ORDER — DOXYCYCLINE HYCLATE 100 MG/1
CAPSULE, GELATIN COATED ORAL TWICE A DAY
Qty: 14 | Refills: 0 | Status: ERX | COMMUNITY
Start: 2025-08-14

## 2025-08-14 RX ORDER — FLUOROURACIL 5 MG/G
CREAM TOPICAL
Qty: 40 | Refills: 0 | Status: ERX | COMMUNITY
Start: 2025-08-14

## 2025-08-14 RX ADMIN — DOXYCYCLINE HYCLATE: 100 CAPSULE, GELATIN COATED ORAL at 00:00

## 2025-08-14 RX ADMIN — FLUOROURACIL: 5 CREAM TOPICAL at 00:00

## (undated) DEVICE — TUBING ENDOSCRUB II (5EA/PK)

## (undated) DEVICE — ELECTRODE 850 FOAM ADHESIVE - HYDROGEL RADIOTRNSPRNT (50/PK)

## (undated) DEVICE — GOWN WARMING STANDARD FLEX - (30/CA)

## (undated) DEVICE — GLOVE BIOGEL PI ORTHO SZ 6 1/2 SURGICAL PF LF (40PR/BX)

## (undated) DEVICE — SODIUM CHL IRRIGATION 0.9% 1000ML (12EA/CA)

## (undated) DEVICE — POINTER TRACKER 1-PACK

## (undated) DEVICE — BLADE STRAIGHT SINUS (5EA/PK)

## (undated) DEVICE — TRACKER ENT INSTRUMENT

## (undated) DEVICE — TUBE CONNECT SUCTION CLEAR 120 X 1/4" (50EA/CA)"

## (undated) DEVICE — SUTURE GENERAL

## (undated) DEVICE — GLOVE BIOGEL ECLIPSE PF LATEX SIZE 8.5 (50PR/BX)

## (undated) DEVICE — SUTURE 3-0 ETHILON FS-1 - (36/BX) 30 INCH

## (undated) DEVICE — Device

## (undated) DEVICE — TRACKER ENT PATIENT

## (undated) DEVICE — TUBING CLEARLINK DUO-VENT - C-FLO (48EA/CA)

## (undated) DEVICE — SURGIFOAM (12X7) - (12EA/CA)

## (undated) DEVICE — BLADE CLIPPER FITS 2501 CLIPPER (BLUE)  (20EA/CA)

## (undated) DEVICE — MASK ANESTHESIA ADULT  - (100/CA)

## (undated) DEVICE — TRAP SPECIMEN MUCUS STERILE - (50/CA)

## (undated) DEVICE — TRACKER PATIENT SKULL MOUNT

## (undated) DEVICE — DETERGENT RENUZYME PLUS 10 OZ PACKET (50/BX)

## (undated) DEVICE — DRAPE SURGICAL U 77X120 - (10/CA)

## (undated) DEVICE — SPONGE NASAL MRCL 400402 (20EA/PK)

## (undated) DEVICE — CATHETER IV ANGIO 20GA X 2IN - ANGIOCATH (50/BX)

## (undated) DEVICE — DRAPE LARGE 3 QUARTER - (20/CA)

## (undated) DEVICE — HEMOSTAT SURG ABSORBABLE - 2 X 3 IN SURGICEL (24EA/CA)

## (undated) DEVICE — BLADE SURGICAL #15 - (50/BX 3BX/CA)

## (undated) DEVICE — CHLORAPREP 26 ML APPLICATOR - ORANGE TINT(25/CA)

## (undated) DEVICE — NEEDLE, DISP  18G X 1-1/2 BLUNT

## (undated) DEVICE — LACTATED RINGERS INJ 1000 ML - (14EA/CA 60CA/PF)

## (undated) DEVICE — SUTURE 3-0 VICRYL PLUS SH - 8X 18 INCH (12/BX)

## (undated) DEVICE — STYLET COIL 2 SINGLE PACKAGE

## (undated) DEVICE — SET EXTENSION WITH 2 PORTS (48EA/CA) ***PART #2C8610 IS A SUBSTITUTE*****

## (undated) DEVICE — HEAD HOLDER JUNIOR/ADULT

## (undated) DEVICE — NEEDLE NON SAFETY 25 GA X 1 1/2 IN HYPO (100EA/BX)

## (undated) DEVICE — GLOVE BIOGEL INDICATOR SZ 8.5 SURGICAL PF LTX - (50/BX 4BX/CA)

## (undated) DEVICE — SUCTION INSTRUMENT YANKAUER BULBOUS TIP W/O VENT (50EA/CA)

## (undated) DEVICE — WATER IRRIGATION STERILE 1000ML (12EA/CA)

## (undated) DEVICE — TRAY SURESTEP FOLEY TEMP SENSING 16FR (10EA/CA) ORDER  #18764 FOR TEMP FOLEY ONLY

## (undated) DEVICE — ANTI-FOG SOLUTION - 60BTL/CA

## (undated) DEVICE — CATHETER IV ANGIOCATH NON-SAFETY DAVINCI 14GA X 5.25 (10/BX)

## (undated) DEVICE — SHEATH SHARP SITE 30 DEGREE ENDOSCRUB II (5EA/PK)

## (undated) DEVICE — SLEEVE, VASO, THIGH, MED

## (undated) DEVICE — PACK ENT OR - (2EA/CA)

## (undated) DEVICE — PACK NEURO - (2EA/CA)

## (undated) DEVICE — DRAPE STRLE REG TOWEL 18X24 - (10/BX 4BX/CA)"

## (undated) DEVICE — LACTATED RINGERS INJ. 500 ML - (24EA/CA)

## (undated) DEVICE — TUBE CONNECTING SUCTION - CLEAR PLASTIC STERILE 72 IN (50EA/CA)

## (undated) DEVICE — BOVIE BLADE COATED - (50/PK)

## (undated) DEVICE — CONTAINER SPECIMEN BAG OR - STERILE 4 OZ W/LID (100EA/CA)

## (undated) DEVICE — CORDS BIPOLAR COAGULATION - 12FT STERILE DISP. (10EA/BX)

## (undated) DEVICE — DRAPE MAYO STAND - (30/CA)

## (undated) DEVICE — ELECTRODE DUAL RETURN W/ CORD - (50/PK)

## (undated) DEVICE — NEPTUNE 4 PORT MANIFOLD - (20/PK)

## (undated) DEVICE — PACK SINGLE BASIN - (6/CA)

## (undated) DEVICE — KIT ANESTHESIA W/CIRCUIT & 3/LT BAG W/FILTER (20EA/CA)

## (undated) DEVICE — DRESSING MASS EYE & EAR SIZE 2 (2EA/PK  50PK/BX  100EA/BX)

## (undated) DEVICE — KIT  I.V. START (100EA/CA)

## (undated) DEVICE — PATTIES SURG X-RAYCOTTONOID - 1/2 X 3 IN (200/CA)

## (undated) DEVICE — SHEET PEDIATRIC LAPAROTOMY - (10/CA)

## (undated) DEVICE — MEDICINE CUP STERILE 2 OZ - (100/CA)

## (undated) DEVICE — CANISTER SUCTION 3000ML MECHANICAL FILTER AUTO SHUTOFF MEDI-VAC NONSTERILE LF DISP  (40EA/CA)

## (undated) DEVICE — GLOVE PROTEXIS W/NEU-THERA SZ 8.5 (50PR/BX)

## (undated) DEVICE — SODIUM CHL. INJ. 0.9% 500ML (24EA/CA 50CA/PF)

## (undated) DEVICE — BOVIE BLADE COATED &INSULATED - 25/PK

## (undated) DEVICE — PROTECTOR ULNA NERVE - (36PR/CA)

## (undated) DEVICE — KIT SURGIFLO W/OUT THROMBIN - (6EA/CA)

## (undated) DEVICE — BLADE SURGICAL CLIPPER - (50EA/CA)

## (undated) DEVICE — SYRINGE SAFETY 10 ML 18 GA X 1 1/2 BLUNT LL (100/BX 4BX/CA)

## (undated) DEVICE — SYRINGE 10 ML CONTROL LL (25EA/BX 4BX/CA)

## (undated) DEVICE — SUCTION 9 FR MALLEABLE STANDARD TIP

## (undated) DEVICE — BOVIE FOOT CONTROL SUCTION - 6IN 10FR (25EA/CA)

## (undated) DEVICE — GLOVE BIOGEL SZ 7.5 SURGICAL PF LTX - (50PR/BX 4BX/CA)

## (undated) DEVICE — NEEDLE NON SAFETY HYPO 22 GA X 1 1/2 IN (100/BX)

## (undated) DEVICE — TOWELS CLOTH SURGICAL - (4/PK 20PK/CA)

## (undated) DEVICE — SPECIMEN PLASTIC CONVERTOR - (10/CA)

## (undated) DEVICE — TRANSDUCER ADULT DISP. SINGLE BONDED STERILE - (20EA/CA)

## (undated) DEVICE — GLOVE BIOGEL INDICATOR SZ 7.5 SURGICAL PF LTX - (50PR/BX 4BX/CA)

## (undated) DEVICE — SET LEADWIRE 5 LEAD BEDSIDE DISPOSABLE ECG (1SET OF 5/EA)

## (undated) DEVICE — NEEDLE SPINAL NON-SAFETY 25GA X 3 (25EA/BX)"

## (undated) DEVICE — CANISTER SUCTION RIGID RED 1500CC (40EA/CA)

## (undated) DEVICE — CATHETER IV 20 GA X 1-1/4 ---SURG.& SDS ONLY--- (50EA/BX)

## (undated) DEVICE — GLOVE BIOGEL PI INDICATOR SZ 6.5 SURGICAL PF LF - (50/BX 4BX/CA)

## (undated) DEVICE — SENSOR SPO2 NEO LNCS ADHESIVE (20/BX) SEE USER NOTES

## (undated) DEVICE — KNIFE BACKCUTTING STRAIGHT 8.0IN HANDLE (MINIMUM ORDER QTY MUST BE 5)